# Patient Record
Sex: FEMALE | Race: WHITE | Employment: OTHER | URBAN - METROPOLITAN AREA
[De-identification: names, ages, dates, MRNs, and addresses within clinical notes are randomized per-mention and may not be internally consistent; named-entity substitution may affect disease eponyms.]

---

## 2017-01-05 RX ORDER — LOSARTAN POTASSIUM 50 MG/1
TABLET ORAL
Qty: 90 TABLET | Refills: 2 | Status: SHIPPED | OUTPATIENT
Start: 2017-01-05 | End: 2017-04-02

## 2017-02-21 ENCOUNTER — OFFICE VISIT (OUTPATIENT)
Dept: INTERNAL MEDICINE CLINIC | Facility: CLINIC | Age: 82
End: 2017-02-21

## 2017-02-21 VITALS
RESPIRATION RATE: 18 BRPM | HEIGHT: 68 IN | TEMPERATURE: 98 F | SYSTOLIC BLOOD PRESSURE: 98 MMHG | OXYGEN SATURATION: 83 % | DIASTOLIC BLOOD PRESSURE: 61 MMHG | HEART RATE: 90 BPM

## 2017-02-21 DIAGNOSIS — J44.9 CHRONIC OBSTRUCTIVE PULMONARY DISEASE, UNSPECIFIED COPD TYPE (HCC): Primary | ICD-10-CM

## 2017-02-21 DIAGNOSIS — R09.02 HYPOXIA: ICD-10-CM

## 2017-02-21 DIAGNOSIS — R26.81 UNSTEADY GAIT: ICD-10-CM

## 2017-02-21 DIAGNOSIS — S12.000D: ICD-10-CM

## 2017-02-21 DIAGNOSIS — E03.9 ACQUIRED HYPOTHYROIDISM: ICD-10-CM

## 2017-02-21 PROCEDURE — G0463 HOSPITAL OUTPT CLINIC VISIT: HCPCS | Performed by: INTERNAL MEDICINE

## 2017-02-21 PROCEDURE — 99214 OFFICE O/P EST MOD 30 MIN: CPT | Performed by: INTERNAL MEDICINE

## 2017-02-27 ENCOUNTER — TELEPHONE (OUTPATIENT)
Dept: INTERNAL MEDICINE CLINIC | Facility: CLINIC | Age: 82
End: 2017-02-27

## 2017-02-28 ENCOUNTER — TELEPHONE (OUTPATIENT)
Dept: INTERNAL MEDICINE CLINIC | Facility: CLINIC | Age: 82
End: 2017-02-28

## 2017-02-28 NOTE — TELEPHONE ENCOUNTER
Brina from [facility] calling and requesting a copy of patients demographic information and insurance info.    Please fax to [phone]

## 2017-03-02 ENCOUNTER — OFFICE VISIT (OUTPATIENT)
Dept: PULMONOLOGY | Facility: CLINIC | Age: 82
End: 2017-03-02

## 2017-03-02 VITALS
WEIGHT: 158 LBS | HEIGHT: 68 IN | OXYGEN SATURATION: 90 % | SYSTOLIC BLOOD PRESSURE: 131 MMHG | RESPIRATION RATE: 18 BRPM | BODY MASS INDEX: 23.95 KG/M2 | HEART RATE: 92 BPM | DIASTOLIC BLOOD PRESSURE: 73 MMHG

## 2017-03-02 DIAGNOSIS — J44.9 CHRONIC OBSTRUCTIVE PULMONARY DISEASE, UNSPECIFIED COPD TYPE (HCC): Primary | ICD-10-CM

## 2017-03-02 PROCEDURE — G0463 HOSPITAL OUTPT CLINIC VISIT: HCPCS | Performed by: INTERNAL MEDICINE

## 2017-03-02 PROCEDURE — 99213 OFFICE O/P EST LOW 20 MIN: CPT | Performed by: INTERNAL MEDICINE

## 2017-03-02 NOTE — TELEPHONE ENCOUNTER
Called to [facility] [phone] and spoke with Mandy Craig RN in the intake dept. He is reviewing the orders and notes for pt and also attempting to assess insurance verfication as well as if they can staff where pt lives.  Awaiting call b

## 2017-03-02 NOTE — PROGRESS NOTES
The patient is an 59-year-old female who I know well from prior evaluation who comes in now for follow-up. Her breathing is about the same. She has a spiculated pulmonary nodule which has been stable on serial CT imaging. Is no fever chills or shakes.

## 2017-03-03 ENCOUNTER — TELEPHONE (OUTPATIENT)
Dept: FAMILY MEDICINE CLINIC | Facility: CLINIC | Age: 82
End: 2017-03-03

## 2017-03-03 NOTE — TELEPHONE ENCOUNTER
[facility] just [facility] me back and said they can't staff her area, so I contacted a previous [address] she had and that was Provider Preferred [address] and spoke with Carmelo Cuevas at [phone] and he said they had pt before and

## 2017-03-03 NOTE — TELEPHONE ENCOUNTER
Pt. Is now being taken care of  for [facility] by [facility]. At [phone]. I spoke with Carolin Gaucher at [facility] and they worked with pt before. Farrah Short was not able to staff pt in that area, so they declined the case.  All is set for [facility]

## 2017-04-03 RX ORDER — LOSARTAN POTASSIUM 50 MG/1
TABLET ORAL
Qty: 90 TABLET | Refills: 0 | Status: SHIPPED | OUTPATIENT
Start: 2017-04-03 | End: 2017-05-22

## 2017-04-03 NOTE — TELEPHONE ENCOUNTER
Hypertensive Medications  Protocol Criteria:  · Appointment scheduled in the past 6 months or in the next 3 months  · BMP or CMP in the past 12 months  · Creatinine result < 2  Recent Visits       Provider Department Primary Dx    1 month ago Kaleigh Nicholas

## 2017-04-12 NOTE — TELEPHONE ENCOUNTER
Hypothyroid Medications  Protocol Criteria:  Appointment scheduled in the past 12 months or the next 3 months  TSH resulted in the past 12 months that is normal  Recent Visits       Provider Department Primary Dx    1 month ago Kaylyn Dubon MD [facility]

## 2017-04-14 NOTE — PROGRESS NOTES
HPI:    Patient ID: Mary Ann Grider is a 80year old female. HPICOPD: Taking medication as directed. Short of breath on 3 L a minute of continuous oxygen by cannula.   Will follow up with Dr. Ashlee Aden  Hypothyroidism treated with levothyroxine 75 MCG daily (CLARITIN) 10 MG Oral Tab Take 10 mg by mouth daily. Disp:  Rfl:    Calcium Carbonate-Vit D-Min (CALTRATE 600+D PLUS MINERALS) 600-800 MG-UNIT Oral Tab Take  by mouth.  Disp:  Rfl:    Albuterol Sulfate HFA (VENTOLIN HFA) 108 (90 BASE) MCG/ACT Inhalation Aer healing, unspecified fracture morphology, subsequent encounter  Follow with neurosurgery as directed  - Home Health Referral - In Network    4. Unsteady gait  Physical therapy at home  - Home Health Referral - In Network    5.  Hypoxia  Continuous oxygen at

## 2017-04-26 RX ORDER — LEVOTHYROXINE SODIUM 0.07 MG/1
TABLET ORAL
Qty: 90 TABLET | Refills: 0 | Status: SHIPPED | OUTPATIENT
Start: 2017-04-26 | End: 2017-07-25

## 2017-04-30 RX ORDER — TRIAMTERENE AND HYDROCHLOROTHIAZIDE 37.5; 25 MG/1; MG/1
CAPSULE ORAL
Qty: 90 CAPSULE | Refills: 2 | Status: SHIPPED | OUTPATIENT
Start: 2017-04-30 | End: 2018-01-24

## 2017-05-22 ENCOUNTER — OFFICE VISIT (OUTPATIENT)
Dept: INTERNAL MEDICINE CLINIC | Facility: CLINIC | Age: 82
End: 2017-05-22

## 2017-05-22 VITALS
WEIGHT: 151 LBS | TEMPERATURE: 98 F | HEART RATE: 87 BPM | BODY MASS INDEX: 22.88 KG/M2 | DIASTOLIC BLOOD PRESSURE: 77 MMHG | SYSTOLIC BLOOD PRESSURE: 124 MMHG | HEIGHT: 68 IN | RESPIRATION RATE: 20 BRPM

## 2017-05-22 DIAGNOSIS — E03.9 ACQUIRED HYPOTHYROIDISM: ICD-10-CM

## 2017-05-22 DIAGNOSIS — E55.9 VITAMIN D DEFICIENCY: ICD-10-CM

## 2017-05-22 DIAGNOSIS — D50.9 IRON DEFICIENCY ANEMIA, UNSPECIFIED IRON DEFICIENCY ANEMIA TYPE: ICD-10-CM

## 2017-05-22 DIAGNOSIS — J44.9 CHRONIC OBSTRUCTIVE PULMONARY DISEASE, UNSPECIFIED COPD TYPE (HCC): ICD-10-CM

## 2017-05-22 DIAGNOSIS — I10 ESSENTIAL HYPERTENSION: Primary | ICD-10-CM

## 2017-05-22 PROCEDURE — 99214 OFFICE O/P EST MOD 30 MIN: CPT | Performed by: INTERNAL MEDICINE

## 2017-05-22 PROCEDURE — G0463 HOSPITAL OUTPT CLINIC VISIT: HCPCS | Performed by: INTERNAL MEDICINE

## 2017-05-22 NOTE — PROGRESS NOTES
HPI:    Patient ID: Vinicius Ernandez is a 80year old female. Hypertension  This is a chronic problem. The current episode started more than 1 year ago. The problem is controlled.  Pertinent negatives include no anxiety, blurred vision, chest pain, headache Rfl: 0   Loperamide HCl (IMODIUM) 2 MG Oral Cap  Disp:  Rfl: 0   Meloxicam 7.5 MG Oral Tab Take 7.5 mg by mouth daily. Disp:  Rfl:    Potassium Chloride ER (K-DUR M20) 20 MEQ Oral Tab CR Take 20 mEq by mouth 2 (two) times daily.  Disp:  Rfl:    ipratropium- hepatosplenomegaly. There is no tenderness. There is no CVA tenderness. Musculoskeletal: She exhibits no edema. Lymphadenopathy:     She has no cervical adenopathy. Neurological: She is alert and oriented to person, place, and time.    Skin: Skin is w

## 2017-05-24 ENCOUNTER — TELEPHONE (OUTPATIENT)
Dept: INTERNAL MEDICINE CLINIC | Facility: CLINIC | Age: 82
End: 2017-05-24

## 2017-05-24 ENCOUNTER — LAB ENCOUNTER (OUTPATIENT)
Dept: LAB | Age: 82
End: 2017-05-24
Attending: INTERNAL MEDICINE
Payer: MEDICARE

## 2017-05-24 DIAGNOSIS — D50.9 IRON DEFICIENCY ANEMIA, UNSPECIFIED IRON DEFICIENCY ANEMIA TYPE: ICD-10-CM

## 2017-05-24 DIAGNOSIS — I10 ESSENTIAL HYPERTENSION: ICD-10-CM

## 2017-05-24 DIAGNOSIS — D72.829 LEUKOCYTOSIS, UNSPECIFIED TYPE: Primary | ICD-10-CM

## 2017-05-24 DIAGNOSIS — F17.200 SMOKER: ICD-10-CM

## 2017-05-24 DIAGNOSIS — E55.9 VITAMIN D DEFICIENCY: ICD-10-CM

## 2017-05-24 DIAGNOSIS — R30.0 DYSURIA: ICD-10-CM

## 2017-05-24 PROCEDURE — 84443 ASSAY THYROID STIM HORMONE: CPT

## 2017-05-24 PROCEDURE — 85025 COMPLETE CBC W/AUTO DIFF WBC: CPT

## 2017-05-24 PROCEDURE — 80061 LIPID PANEL: CPT

## 2017-05-24 PROCEDURE — 82746 ASSAY OF FOLIC ACID SERUM: CPT

## 2017-05-24 PROCEDURE — 82607 VITAMIN B-12: CPT

## 2017-05-24 PROCEDURE — 82306 VITAMIN D 25 HYDROXY: CPT

## 2017-05-24 PROCEDURE — 82728 ASSAY OF FERRITIN: CPT

## 2017-05-24 PROCEDURE — 36415 COLL VENOUS BLD VENIPUNCTURE: CPT

## 2017-05-24 NOTE — TELEPHONE ENCOUNTER
Kulwant Lopes dropped off at the [facility] [employer] 3rd flr a list of patients medication as requested by dr Hero Torres. Placed in tray at [facility] 3rd flr in box of Dr Hero Torres 5/24/17.

## 2017-06-05 ENCOUNTER — TELEPHONE (OUTPATIENT)
Dept: FAMILY MEDICINE CLINIC | Facility: CLINIC | Age: 82
End: 2017-06-05

## 2017-06-05 NOTE — TELEPHONE ENCOUNTER
----- Message from Thee Meza MD sent at 6/4/2017  5:33 PM CDT -----  Please call patient with blood test results    That are stable - except elevated white blood cell count 14 k .months ago was normal 8.8 but few 1 year ago was elevated 28 k  and

## 2017-06-13 ENCOUNTER — OFFICE VISIT (OUTPATIENT)
Dept: HEMATOLOGY/ONCOLOGY | Facility: HOSPITAL | Age: 82
End: 2017-06-13
Attending: INTERNAL MEDICINE
Payer: MEDICARE

## 2017-06-13 VITALS
DIASTOLIC BLOOD PRESSURE: 71 MMHG | RESPIRATION RATE: 18 BRPM | OXYGEN SATURATION: 93 % | HEIGHT: 68 IN | TEMPERATURE: 98 F | SYSTOLIC BLOOD PRESSURE: 133 MMHG | BODY MASS INDEX: 22.58 KG/M2 | WEIGHT: 149 LBS | HEART RATE: 96 BPM

## 2017-06-13 DIAGNOSIS — E03.9 ACQUIRED HYPOTHYROIDISM: ICD-10-CM

## 2017-06-13 DIAGNOSIS — Z86.39 HISTORY OF IRON DEFICIENCY: ICD-10-CM

## 2017-06-13 DIAGNOSIS — J44.9 CHRONIC OBSTRUCTIVE PULMONARY DISEASE, UNSPECIFIED COPD TYPE (HCC): ICD-10-CM

## 2017-06-13 DIAGNOSIS — R91.8 PULMONARY NODULES: ICD-10-CM

## 2017-06-13 DIAGNOSIS — D72.9 NEUTROPHILIC LEUKOCYTOSIS: Primary | ICD-10-CM

## 2017-06-13 DIAGNOSIS — T07.XXXA MULTIPLE FRACTURES: ICD-10-CM

## 2017-06-13 PROCEDURE — 99203 OFFICE O/P NEW LOW 30 MIN: CPT | Performed by: INTERNAL MEDICINE

## 2017-06-13 PROCEDURE — G0463 HOSPITAL OUTPT CLINIC VISIT: HCPCS | Performed by: INTERNAL MEDICINE

## 2017-06-13 RX ORDER — UBIDECARENONE/VIT E ACET 100MG-5
100 CAPSULE ORAL DAILY
COMMUNITY

## 2017-06-13 RX ORDER — UBIDECARENONE/VITAMIN E MIXED 100 MG-100
100 CAPSULE ORAL DAILY
COMMUNITY

## 2017-06-13 RX ORDER — BIOTIN 1 MG
2 TABLET ORAL DAILY
COMMUNITY

## 2017-06-13 RX ORDER — PROPRANOLOL/HYDROCHLOROTHIAZID 40 MG-25MG
500 TABLET ORAL DAILY
COMMUNITY

## 2017-06-13 RX ORDER — PERPHENAZINE/AMITRIPTYLINE HCL 2 MG-10 MG
4 TABLET ORAL DAILY
COMMUNITY

## 2017-06-14 NOTE — TELEPHONE ENCOUNTER
Chart reviewed and patient was seen by Dr. Arley gomez. 6/13/17. Results were discussed with patient; see lab notes.

## 2017-06-15 ENCOUNTER — TELEPHONE (OUTPATIENT)
Dept: INTERNAL MEDICINE CLINIC | Facility: CLINIC | Age: 82
End: 2017-06-15

## 2017-06-15 NOTE — TELEPHONE ENCOUNTER
JOLLY valentino a Plan of care physicians orders, PT discharge and recert order was faxed into the office in May and last Tuesday   HHN need to make sure these forms are completed and returned lee Valentino the Claude is in the building and these forms are nejulianne

## 2017-06-22 ENCOUNTER — TELEPHONE (OUTPATIENT)
Dept: PULMONOLOGY | Facility: CLINIC | Age: 82
End: 2017-06-22

## 2017-06-22 ENCOUNTER — LAB ENCOUNTER (OUTPATIENT)
Dept: LAB | Age: 82
End: 2017-06-22
Attending: INTERNAL MEDICINE
Payer: MEDICARE

## 2017-06-22 DIAGNOSIS — D72.829 LEUKOCYTOSIS, UNSPECIFIED TYPE: ICD-10-CM

## 2017-06-22 DIAGNOSIS — R30.0 DYSURIA: ICD-10-CM

## 2017-06-22 DIAGNOSIS — I10 ESSENTIAL HYPERTENSION: ICD-10-CM

## 2017-06-22 PROCEDURE — 36415 COLL VENOUS BLD VENIPUNCTURE: CPT

## 2017-06-22 PROCEDURE — 85025 COMPLETE CBC W/AUTO DIFF WBC: CPT

## 2017-06-22 PROCEDURE — 80053 COMPREHEN METABOLIC PANEL: CPT

## 2017-06-22 PROCEDURE — 87086 URINE CULTURE/COLONY COUNT: CPT

## 2017-06-22 PROCEDURE — 84443 ASSAY THYROID STIM HORMONE: CPT

## 2017-06-25 NOTE — PROGRESS NOTES
Please call patient with blood test results. Kidney function is decreased and stable and liver function are normal, no anemia.    sugar is mildly elevated not sure if patient was fasting  Thyroid hormone is normal as well. Urine culture is clear.   No

## 2017-06-26 ENCOUNTER — TELEPHONE (OUTPATIENT)
Dept: INTERNAL MEDICINE CLINIC | Facility: CLINIC | Age: 82
End: 2017-06-26

## 2017-06-26 ENCOUNTER — HOSPITAL ENCOUNTER (INPATIENT)
Facility: HOSPITAL | Age: 82
LOS: 2 days | Discharge: HOME | DRG: 603 | End: 2017-06-28
Attending: EMERGENCY MEDICINE | Admitting: HOSPITALIST
Payer: MEDICARE

## 2017-06-26 DIAGNOSIS — L03.115 CELLULITIS OF RIGHT LOWER EXTREMITY: Primary | ICD-10-CM

## 2017-06-26 PROBLEM — R79.89 AZOTEMIA: Status: ACTIVE | Noted: 2017-06-26

## 2017-06-26 LAB
ANION GAP SERPL CALC-SCNC: 11 MMOL/L (ref 0–18)
BASOPHILS # BLD: 0.1 K/UL (ref 0–0.2)
BASOPHILS NFR BLD: 1 %
BNP SERPL-MCNC: 50 PG/ML (ref 0–100)
BUN SERPL-MCNC: 25 MG/DL (ref 8–20)
BUN/CREAT SERPL: 23.4 (ref 10–20)
CALCIUM SERPL-MCNC: 9.4 MG/DL (ref 8.5–10.5)
CHLORIDE SERPL-SCNC: 90 MMOL/L (ref 95–110)
CO2 SERPL-SCNC: 37 MMOL/L (ref 22–32)
CREAT SERPL-MCNC: 1.07 MG/DL (ref 0.5–1.5)
EOSINOPHIL # BLD: 0.3 K/UL (ref 0–0.7)
EOSINOPHIL NFR BLD: 3 %
ERYTHROCYTE [DISTWIDTH] IN BLOOD BY AUTOMATED COUNT: 12.9 % (ref 11–15)
GLUCOSE SERPL-MCNC: 113 MG/DL (ref 70–99)
HCT VFR BLD AUTO: 40.6 % (ref 35–48)
HGB BLD-MCNC: 13.1 G/DL (ref 12–16)
LYMPHOCYTES # BLD: 1.3 K/UL (ref 1–4)
LYMPHOCYTES NFR BLD: 14 %
MCH RBC QN AUTO: 29.9 PG (ref 27–32)
MCHC RBC AUTO-ENTMCNC: 32.2 G/DL (ref 32–37)
MCV RBC AUTO: 92.7 FL (ref 80–100)
MONOCYTES # BLD: 1 K/UL (ref 0–1)
MONOCYTES NFR BLD: 10 %
NEUTROPHILS # BLD AUTO: 6.8 K/UL (ref 1.8–7.7)
NEUTROPHILS NFR BLD: 72 %
OSMOLALITY UR CALC.SUM OF ELEC: 291 MOSM/KG (ref 275–295)
PLATELET # BLD AUTO: 214 K/UL (ref 140–400)
PMV BLD AUTO: 9 FL (ref 7.4–10.3)
POTASSIUM SERPL-SCNC: 3.4 MMOL/L (ref 3.3–5.1)
RBC # BLD AUTO: 4.38 M/UL (ref 3.7–5.4)
SODIUM SERPL-SCNC: 138 MMOL/L (ref 136–144)
WBC # BLD AUTO: 9.4 K/UL (ref 4–11)

## 2017-06-26 PROCEDURE — 99222 1ST HOSP IP/OBS MODERATE 55: CPT | Performed by: HOSPITALIST

## 2017-06-26 RX ORDER — BISACODYL 10 MG
10 SUPPOSITORY, RECTAL RECTAL
Status: DISCONTINUED | OUTPATIENT
Start: 2017-06-26 | End: 2017-06-28

## 2017-06-26 RX ORDER — ONDANSETRON 2 MG/ML
4 INJECTION INTRAMUSCULAR; INTRAVENOUS EVERY 6 HOURS PRN
Status: DISCONTINUED | OUTPATIENT
Start: 2017-06-26 | End: 2017-06-28

## 2017-06-26 RX ORDER — HYDROCODONE BITARTRATE AND ACETAMINOPHEN 5; 325 MG/1; MG/1
1 TABLET ORAL EVERY 4 HOURS PRN
Status: DISCONTINUED | OUTPATIENT
Start: 2017-06-26 | End: 2017-06-28

## 2017-06-26 RX ORDER — ACETAMINOPHEN 325 MG/1
650 TABLET ORAL EVERY 4 HOURS PRN
Status: DISCONTINUED | OUTPATIENT
Start: 2017-06-26 | End: 2017-06-28

## 2017-06-26 RX ORDER — HYDROCODONE BITARTRATE AND ACETAMINOPHEN 5; 325 MG/1; MG/1
2 TABLET ORAL EVERY 4 HOURS PRN
Status: DISCONTINUED | OUTPATIENT
Start: 2017-06-26 | End: 2017-06-28

## 2017-06-26 RX ORDER — ACETAMINOPHEN 325 MG/1
650 TABLET ORAL EVERY 6 HOURS PRN
Status: DISCONTINUED | OUTPATIENT
Start: 2017-06-26 | End: 2017-06-28

## 2017-06-26 RX ORDER — POLYETHYLENE GLYCOL 3350 17 G/17G
17 POWDER, FOR SOLUTION ORAL DAILY PRN
Status: DISCONTINUED | OUTPATIENT
Start: 2017-06-26 | End: 2017-06-28

## 2017-06-26 RX ORDER — SODIUM PHOSPHATE, DIBASIC AND SODIUM PHOSPHATE, MONOBASIC 7; 19 G/133ML; G/133ML
1 ENEMA RECTAL ONCE AS NEEDED
Status: DISCONTINUED | OUTPATIENT
Start: 2017-06-26 | End: 2017-06-28

## 2017-06-26 RX ORDER — DOCUSATE SODIUM 100 MG/1
100 CAPSULE, LIQUID FILLED ORAL 2 TIMES DAILY
Status: DISCONTINUED | OUTPATIENT
Start: 2017-06-26 | End: 2017-06-28

## 2017-06-26 RX ORDER — HEPARIN SODIUM 5000 [USP'U]/ML
5000 INJECTION, SOLUTION INTRAVENOUS; SUBCUTANEOUS EVERY 12 HOURS SCHEDULED
Status: DISCONTINUED | OUTPATIENT
Start: 2017-06-26 | End: 2017-06-28

## 2017-06-26 RX ORDER — SACCHAROMYCES BOULARDII 250 MG
250 CAPSULE ORAL 2 TIMES DAILY
Status: DISCONTINUED | OUTPATIENT
Start: 2017-06-26 | End: 2017-06-28

## 2017-06-26 NOTE — H&P
[facility]    PATIENT'S NAME: Anni    ATTENDING PHYSICIAN: Jamila Younger MD   PATIENT ACCOUNT#:   [id number]    LOCATION:  [address]  MEDICAL RECORD #:   [id number]       YOB: 1929  ADMISSION DATE:       06/26/2017 is overall a poor historian. She does not remember if she had any trauma on her foot. Other 12-point review of systems negative. PHYSICAL EXAMINATION:    GENERAL:  Alert and oriented to time, place and person, no acute distress.   VITAL SIGNS:  [employer]

## 2017-06-26 NOTE — TELEPHONE ENCOUNTER
Actions Requested: wanted appt for eval.  Sent to ED for immediate eval  Situation/Background   Problem: right foot swelling and redness, painful 4/10   Onset: this morning < 24 hours   Associated Symptoms: HHRN calling in no trauma or injury to site, able Difficulty breathing at rest  * Thigh or calf pain and only 1 side and present > 1 hour  * Thigh, calf, or ankle swelling in only one leg  * Thigh, calf, or ankle swelling in both legs, but one side is definitely more swollen  * Cast on leg or ankle and ha

## 2017-06-26 NOTE — ED PROVIDER NOTES
Patient Seen in: [facility] Emergency Department    History   Patient presents with:  Swelling Edema (cardiovascular, metabolic)    Stated Complaint: R foot cellulitis     HPI    The patient is an 26-year-old female who presents with right foot re Losartan Potassium (COZAAR) 50 MG Oral Tab,  TAKE 1 TABLET DAILY, TAKE TWICE A DAY IF BLOOD PRESSURE GREATER THAN 150/90   ipratropium-albuterol (DUONEB) 0.5-2.5 (3) MG/3ML Inhalation Solution,  Take 3 mL by nebulization every 4 (four) hours as needed. Cardiovascular: Normal rate, regular rhythm, normal heart sounds and intact distal pulses. No murmur heard. Pulmonary/Chest: Effort normal and breath sounds normal. She has no wheezes. Abdominal: Soft.  Bowel sounds are normal. She exhibits no distens ------------------------------------------------------------   IV Ancef started   MDM     Pulse Ox: (!) 89%, Abnormal - but at baseline, improved after O2 to 94%    Cardiac Monitor: Pulse Readings from Last 1 Encounters:  06/26/17 : 91  , sinus, normal

## 2017-06-26 NOTE — ED INITIAL ASSESSMENT (HPI)
Right ankle swelling pt is unaware of how long its been swollen. Pt denies trauma to area. Pt is on 2l nc from home and reports hx of copd.  Denies hx of chf

## 2017-06-26 NOTE — ED NOTES
Care assumed from triage alert and interactive with 24 hour hx edema to R foot.  R foot with cellulitic appearance - indurated, edematous with mild pain + DP/PT pulses

## 2017-06-27 LAB
ANION GAP SERPL CALC-SCNC: 11 MMOL/L (ref 0–18)
BASOPHILS # BLD: 0 K/UL (ref 0–0.2)
BASOPHILS NFR BLD: 1 %
BUN SERPL-MCNC: 20 MG/DL (ref 8–20)
BUN/CREAT SERPL: 18.7 (ref 10–20)
CALCIUM SERPL-MCNC: 8.9 MG/DL (ref 8.5–10.5)
CHLORIDE SERPL-SCNC: 94 MMOL/L (ref 95–110)
CO2 SERPL-SCNC: 33 MMOL/L (ref 22–32)
CREAT SERPL-MCNC: 1.07 MG/DL (ref 0.5–1.5)
EOSINOPHIL # BLD: 0.4 K/UL (ref 0–0.7)
EOSINOPHIL NFR BLD: 5 %
ERYTHROCYTE [DISTWIDTH] IN BLOOD BY AUTOMATED COUNT: 13.2 % (ref 11–15)
GLUCOSE SERPL-MCNC: 94 MG/DL (ref 70–99)
HCT VFR BLD AUTO: 37.1 % (ref 35–48)
HGB BLD-MCNC: 12.3 G/DL (ref 12–16)
LYMPHOCYTES # BLD: 1.3 K/UL (ref 1–4)
LYMPHOCYTES NFR BLD: 17 %
MCH RBC QN AUTO: 30.1 PG (ref 27–32)
MCHC RBC AUTO-ENTMCNC: 33.2 G/DL (ref 32–37)
MCV RBC AUTO: 90.9 FL (ref 80–100)
MONOCYTES # BLD: 0.8 K/UL (ref 0–1)
MONOCYTES NFR BLD: 11 %
NEUTROPHILS # BLD AUTO: 5.2 K/UL (ref 1.8–7.7)
NEUTROPHILS NFR BLD: 67 %
OSMOLALITY UR CALC.SUM OF ELEC: 288 MOSM/KG (ref 275–295)
PLATELET # BLD AUTO: 182 K/UL (ref 140–400)
PMV BLD AUTO: 8.6 FL (ref 7.4–10.3)
POTASSIUM SERPL-SCNC: 3 MMOL/L (ref 3.3–5.1)
RBC # BLD AUTO: 4.08 M/UL (ref 3.7–5.4)
SODIUM SERPL-SCNC: 138 MMOL/L (ref 136–144)
WBC # BLD AUTO: 7.7 K/UL (ref 4–11)

## 2017-06-27 PROCEDURE — 99233 SBSQ HOSP IP/OBS HIGH 50: CPT | Performed by: HOSPITALIST

## 2017-06-27 RX ORDER — ARIPIPRAZOLE 15 MG/1
40 TABLET ORAL EVERY 4 HOURS
Status: COMPLETED | OUTPATIENT
Start: 2017-06-27 | End: 2017-06-27

## 2017-06-27 RX ORDER — LEVOTHYROXINE SODIUM 0.07 MG/1
75 TABLET ORAL DAILY
Status: DISCONTINUED | OUTPATIENT
Start: 2017-06-27 | End: 2017-06-28

## 2017-06-27 RX ORDER — TRIAMTERENE AND HYDROCHLOROTHIAZIDE 37.5; 25 MG/1; MG/1
1 CAPSULE ORAL DAILY
Status: DISCONTINUED | OUTPATIENT
Start: 2017-06-27 | End: 2017-06-28

## 2017-06-27 RX ORDER — 0.9 % SODIUM CHLORIDE 0.9 %
VIAL (ML) INJECTION
Status: COMPLETED
Start: 2017-06-27 | End: 2017-06-27

## 2017-06-27 RX ORDER — IPRATROPIUM BROMIDE AND ALBUTEROL SULFATE 2.5; .5 MG/3ML; MG/3ML
3 SOLUTION RESPIRATORY (INHALATION) EVERY 4 HOURS PRN
Status: DISCONTINUED | OUTPATIENT
Start: 2017-06-27 | End: 2017-06-28

## 2017-06-27 NOTE — CM/SW NOTE
Met with patient at bedside. Patient lives home with her brother. Patient has home oxygen and a home nebulizer. Patient has a cane and walker at home and uses either one as needed. Patient does have a Teresita Caballero Rn and PT but is unsure the name of the company.  PT

## 2017-06-27 NOTE — CM/SW NOTE
Met with patient at bedside to explain the BPCI/Medicare program. Patient agreed with phone follow up for 3 months from [address] [place] [address] after discharge from [address] [place] [address]. Patient was enrolled under DRG  603  . BPCI/Medicare letter and brochure provided.

## 2017-06-27 NOTE — PHYSICAL THERAPY NOTE
PHYSICAL THERAPY EVALUATION - INPATIENT     Room Number: 544/544-A  Evaluation Date: 6/27/2017  Type of Evaluation: Initial  Physician Order: PT Eval and Treat    Presenting Problem: cellulitis  Reason for Therapy: Mobility Dysfunction and Discharge Pl History  Past Medical History:   Diagnosis Date   • Actinic keratosis    • COPD (chronic obstructive pulmonary disease) ([facility]    • Fracture, cervical vertebra ([facility] 9/2014    surgically repaired   • Hearing impairment    • High blood pressure    • History chair with arms (e.g., wheelchair, bedside commode, etc.): None   -   Moving from lying on back to sitting on the side of the bed?: None   How much help from another person does the patient currently need. ..   -   Moving to and from a bed to a chair (inclu Goal #3 Patient is able to ambulate 150 feet with assist device: walker - rolling at assistance level: modified independent   Goal #3   Current Status    Goal #4 Patient will negotiate 3  stairs/one curb w/ assistive device and supervision   Goal #4   Cu

## 2017-06-27 NOTE — PLAN OF CARE
Problem: Patient/Family Goals  Goal: Patient/Family Long Term Goal  Patient's Long Term Goal: To be discharged    Interventions:  - Follow MD's orders  - Take medications as prescribed  - See additional Care Plan goals for specific interventions   Outcome: assist with strengthening/mobility  - Encourage toileting schedule  Outcome: Progressing  Pt's bed is in the lowest position; the call light is within reach; the bed alarm is activated; and pt calls appropriately.      Problem: SKIN/TISSUE INTEGRITY - ADULT

## 2017-06-27 NOTE — PROGRESS NOTES
[facility]    Progress Note    Dora Bristol Patient Status:  Inpatient    1929 MRN [id number]   Location [facility] 5SW/SE Attending Sallie Gregorio MD   Hosp Day # 1 PCP Carmina Og MD       Subjective:     Right f

## 2017-06-27 NOTE — PLAN OF CARE
Problem: Patient/Family Goals  Goal: Patient/Family Long Term Goal  Patient's Long Term Goal: To be discharged    Interventions:  - Follow MD's orders  - Take medications as prescribed  - See additional Care Plan goals for specific interventions    Outcome strengthening/mobility  - Encourage toileting schedule   Outcome: Progressing      Problem: SKIN/TISSUE INTEGRITY - ADULT  Goal: Skin integrity remains intact  INTERVENTIONS  - Assess and document risk factors for pressure ulcer development  - Assess and d

## 2017-06-28 VITALS
OXYGEN SATURATION: 99 % | TEMPERATURE: 98 F | SYSTOLIC BLOOD PRESSURE: 125 MMHG | RESPIRATION RATE: 18 BRPM | WEIGHT: 149 LBS | HEART RATE: 95 BPM | BODY MASS INDEX: 22.58 KG/M2 | HEIGHT: 68 IN | DIASTOLIC BLOOD PRESSURE: 64 MMHG

## 2017-06-28 LAB — POTASSIUM SERPL-SCNC: 4.1 MMOL/L (ref 3.3–5.1)

## 2017-06-28 PROCEDURE — 99239 HOSP IP/OBS DSCHRG MGMT >30: CPT | Performed by: HOSPITALIST

## 2017-06-28 RX ORDER — POLYETHYLENE GLYCOL 3350 17 G/17G
17 POWDER, FOR SOLUTION ORAL 2 TIMES DAILY PRN
Qty: 30 EACH | Refills: 0 | Status: SHIPPED | OUTPATIENT
Start: 2017-06-28

## 2017-06-28 RX ORDER — CEFADROXIL 500 MG/1
500 CAPSULE ORAL 2 TIMES DAILY
Qty: 14 CAPSULE | Refills: 0 | Status: SHIPPED | OUTPATIENT
Start: 2017-06-28 | End: 2017-07-05

## 2017-06-28 RX ORDER — SACCHAROMYCES BOULARDII 250 MG
250 CAPSULE ORAL 2 TIMES DAILY
Qty: 14 CAPSULE | Refills: 0 | Status: SHIPPED | OUTPATIENT
Start: 2017-06-28 | End: 2017-07-05

## 2017-06-28 RX ORDER — HYDROCODONE BITARTRATE AND ACETAMINOPHEN 5; 325 MG/1; MG/1
1 TABLET ORAL EVERY 6 HOURS PRN
Qty: 20 TABLET | Refills: 0 | Status: SHIPPED | OUTPATIENT
Start: 2017-06-28 | End: 2018-03-19 | Stop reason: ALTCHOICE

## 2017-06-28 NOTE — PLAN OF CARE
Problem: Patient/Family Goals  Goal: Patient/Family Long Term Goal  Patient's Long Term Goal: To be discharged    Interventions:  - Follow MD's orders  - Take medications as prescribed  - See additional Care Plan goals for specific interventions    Outcome strengthening/mobility  - Encourage toileting schedule   Outcome: Progressing  Pt's bed is in the lowest position; the safety socks are on when she ambulates; her call light is within reach; and pt call appropriately.  The bed alarm is also activated for sa

## 2017-06-28 NOTE — PHYSICAL THERAPY NOTE
Patient seen by restorative care this morning and would like to rest.  RN, Garcia Farfan, aware that pt was seen by restorative therapy this AM.   PT to follow up later today or tomorrow.

## 2017-06-28 NOTE — DISCHARGE SUMMARY
[facility]    Discharge Summary    [facility] Patient Status:  Inpatient    1929 MRN [id number]   Location [facility] 5SW/SE Attending No att. providers found   Hosp Day # 2 PCP Thee Meza MD     Date of Admissi BUN of 25, and creatinine 1.05. Estimated GFR 49, which is around her baseline. CBC was unremarkable. The patient was started on IV Ancef, and she will be admitted to the hospital for further management and evaluation.     Hospital Course:     Pt was adm MG/3ML Soln  Commonly known as:  DUONEB      Take 3 mL by nebulization every 4 (four) hours as needed.    Quantity:  180 vial  Refills:  5     Levothyroxine Sodium 75 MCG Tabs  Commonly known as:  SYNTHROID, LEVOTHROID      TAKE 1 TABLET DAILY   Quantity: Caps  · HYDROcodone-acetaminophen 5-325 MG Tabs  · PEG 3350 Pack  · saccharomyces boulardii 250 MG Caps         Follow up Visits: Follow-up Information     Nimco Cooper MD In 5 days.     Specialty:  Internal Medicine  Contact information:  [address]

## 2017-06-28 NOTE — CM/SW NOTE
Patients [facility] is Provider Preferred in [facility]. Clinicals and resumption orders sent to Provider Preferred at [phone].  They were notified of patients discharge today and will resume care starting 6/29/17

## 2017-06-28 NOTE — DISCHARGE PLANNING
Discharge instructions and prescriptions given to patient. Patient and brother instructed to follow up with doctor for appointment. Saline lock removed. Patient transported by wheelchair and discharge home.  Patient and brother verbalized understanding of d

## 2017-06-28 NOTE — RESTORATIVE THERAPY
RESTORATIVE CARE TREATMENT NOTE    Presenting Problem  Presenting Problem: cellulitis          Precautions          Weight Bearing Restriction                      SUNDAY MONDAY TUESDAY WEDNESDAY THURSDAY FRIDAY SATURDAY   TRANSFERS          Bed <-> Chair

## 2017-06-28 NOTE — PLAN OF CARE
Problem: Patient/Family Goals  Goal: Patient/Family Long Term Goal  Patient's Long Term Goal: To be discharged    Interventions:  - Follow MD's orders  - Take medications as prescribed  - See additional Care Plan goals for specific interventions    Outcome ADULT  Goal: Absence of fever/infection during anticipated neutropenic period  INTERVENTIONS  - Monitor WBC  - Administer growth factors as ordered  - Implement neutropenic guidelines   Outcome: Progressing  (1) Patient remains afebrile and receives iv ant

## 2017-06-29 ENCOUNTER — PATIENT OUTREACH (OUTPATIENT)
Dept: FAMILY MEDICINE CLINIC | Facility: CLINIC | Age: 82
End: 2017-06-29

## 2017-06-29 NOTE — PROGRESS NOTES
Initial Post Discharge Follow Up   Discharge Date: 6/28/17  Contact Date: 6/29/2017    Consent Verification:  Assessment Completed With: Brother Hilary Schumacher) Per the verbal ok of the pt. HIPAA Verified? Yes    1.  Tell me why you were in the hospital?  \" I DAILY Disp: 180 each Rfl: 3   SPIRIVA HANDIHALER 18 MCG Inhalation Cap INHALE THE CONTENTS OF 1 CAPSULE DAILY Disp: 90 capsule Rfl: 3   Losartan Potassium (COZAAR) 50 MG Oral Tab TAKE 1 TABLET DAILY, TAKE TWICE A DAY IF BLOOD PRESSURE GREATER THAN 150/90 D to your hospitalization? No. She has to lay with her leg elevated. 12. In need of referral for:  Brother declined need for    13.  Do you have a follow up visit scheduled with your Primary Care Physician or Specialist?    Your appointments     Date & Ti and he stated understanding, but said at this point she claims she is pain free. He has continued to keep her leg elevated when she is seated or lying.  He said the redness actually looks to have gone down from the hospital and he is aware to watch her for

## 2017-07-03 NOTE — TELEPHONE ENCOUNTER
Per  of pt,  ROMERO told them that pt will have a RN to visit pt and until now nobody is showing up or call about RN visit or in home therapy. Pls advise. n/a

## 2017-07-10 ENCOUNTER — APPOINTMENT (OUTPATIENT)
Dept: CT IMAGING | Facility: HOSPITAL | Age: 82
End: 2017-07-10
Attending: INTERNAL MEDICINE
Payer: MEDICARE

## 2017-07-10 ENCOUNTER — HOSPITAL ENCOUNTER (OUTPATIENT)
Dept: GENERAL RADIOLOGY | Facility: HOSPITAL | Age: 82
Discharge: HOME | End: 2017-07-10
Attending: INTERNAL MEDICINE | Admitting: INTERNAL MEDICINE
Payer: MEDICARE

## 2017-07-10 ENCOUNTER — OFFICE VISIT (OUTPATIENT)
Dept: INTERNAL MEDICINE CLINIC | Facility: CLINIC | Age: 82
End: 2017-07-10

## 2017-07-10 VITALS
DIASTOLIC BLOOD PRESSURE: 73 MMHG | SYSTOLIC BLOOD PRESSURE: 116 MMHG | WEIGHT: 145 LBS | TEMPERATURE: 98 F | HEART RATE: 90 BPM | BODY MASS INDEX: 21.98 KG/M2 | RESPIRATION RATE: 20 BRPM | HEIGHT: 68 IN

## 2017-07-10 DIAGNOSIS — J44.9 CHRONIC OBSTRUCTIVE PULMONARY DISEASE, UNSPECIFIED COPD TYPE (HCC): Primary | ICD-10-CM

## 2017-07-10 DIAGNOSIS — F17.200 SMOKER: ICD-10-CM

## 2017-07-10 DIAGNOSIS — D72.829 LEUKOCYTOSIS, UNSPECIFIED TYPE: ICD-10-CM

## 2017-07-10 DIAGNOSIS — I10 ESSENTIAL HYPERTENSION: ICD-10-CM

## 2017-07-10 PROCEDURE — 99495 TRANSJ CARE MGMT MOD F2F 14D: CPT | Performed by: INTERNAL MEDICINE

## 2017-07-10 PROCEDURE — 71020 XR CHEST PA + LAT CHEST (CPT=71020): CPT | Performed by: INTERNAL MEDICINE

## 2017-07-10 NOTE — PROGRESS NOTES
HPI:    Jae Toledo is a 80year old female here today for hospital follow up.    She was discharged from Inpatient hospital, [facility]  to Home   Admission Date: 6/26/17   Discharge Date: 6/28/17  Hospital Discharge Diagnosis: Right foot cellul stabilised  And  Sent    Home   On  Oral  Antibiotic  That she  Competed  ,   Denies any pain  And  Redness   Is   Almost  Gone , no  Diarrhea  Patient  states doing well otherwise, denies chest pain, shortness of breath, use  3 L  o2  continuously or hear (four) hours as needed. loratadine (CLARITIN) 10 MG Oral Tab Take 10 mg by mouth daily as needed.      Albuterol Sulfate HFA (VENTOLIN HFA) 108 (90 BASE) MCG/ACT Inhalation Aero Soln Inhale 2 puffs into the lungs every 4 (four) hours as needed for [facility] thyroid history  ALL/ASTHMA: denies hx of allergy or asthma    PHYSICAL EXAM:   No LMP recorded. Patient is postmenopausal.  Estimated body mass index is 22.05 kg/m² as calculated from the following:    Height as of this encounter: 5' 8\" (1.727 m).     Khushbu Hernandez With pt      F/u  Dr Amanda Choudhary  1 month       No orders of the defined types were placed in this encounter.       Meds & Refills for this Visit:  No prescriptions requested or ordered in this encounter    Imaging & Consults:  None         Transitional Care Man

## 2017-07-13 ENCOUNTER — TELEPHONE (OUTPATIENT)
Dept: INTERNAL MEDICINE CLINIC | Facility: CLINIC | Age: 82
End: 2017-07-13

## 2017-07-13 NOTE — TELEPHONE ENCOUNTER
Actions Requested: patient spouse, Amberly Solis, would like advice for treatment of constipation, 5 days without BM with daily Miralax and use of MOM last night, without abdominal distention or pain.  Requests message to be sent to provider, will await call backP

## 2017-07-19 ENCOUNTER — TELEPHONE (OUTPATIENT)
Dept: INTERNAL MEDICINE CLINIC | Facility: CLINIC | Age: 82
End: 2017-07-19

## 2017-07-19 NOTE — TELEPHONE ENCOUNTER
Per brother of pt,  Pt needs refill on her Albuterol Sulfate HFA ,     Current Outpatient Prescriptions:                                                                                                              Albuterol Sulfate HFA (VENTOLIN HFA) 108 (

## 2017-07-20 NOTE — TELEPHONE ENCOUNTER
please advise as does not meet RN protocol for refill criteria- rx listed as historical only    PCP out of office, message to oncall    Asthma & COPD:   Refill Protocol Appointment Criteria  · Appointment scheduled in the past 6 months or in the next 3 mo

## 2017-07-21 RX ORDER — ALBUTEROL SULFATE 90 UG/1
2 AEROSOL, METERED RESPIRATORY (INHALATION) EVERY 4 HOURS PRN
Qty: 3 INHALER | Refills: 0 | Status: SHIPPED | OUTPATIENT
Start: 2017-07-21 | End: 2017-07-24

## 2017-07-24 ENCOUNTER — TELEPHONE (OUTPATIENT)
Dept: INTERNAL MEDICINE CLINIC | Facility: CLINIC | Age: 82
End: 2017-07-24

## 2017-07-24 RX ORDER — ALBUTEROL SULFATE 90 UG/1
2 AEROSOL, METERED RESPIRATORY (INHALATION) EVERY 4 HOURS PRN
Qty: 1 INHALER | Refills: 0 | Status: SHIPPED | OUTPATIENT
Start: 2017-07-24

## 2017-07-24 NOTE — TELEPHONE ENCOUNTER
Pts brother calling and stts the script was never sent to the pharmacy. [facility]  [phone], [phone]

## 2017-07-24 NOTE — TELEPHONE ENCOUNTER
Provider Prefered [facility] [address] calling states pt will need new rx, states she spoke to them today and they still have not received the rx yet. Pt is out of meds please resend rx ASAP. Pt would like an emergency supply sent to local [facility] [address] as well.

## 2017-07-24 NOTE — TELEPHONE ENCOUNTER
Pt had requested one rx for Albuterol be sent to Pharmacy on [address] as pt is totally out and needs a rescue inhaler.     One sent to [address] per pt request

## 2017-07-25 NOTE — TELEPHONE ENCOUNTER
Spoke to [facility] from pharmacy. States did receive prescription. Will be notifying pt when rx is ready for .

## 2017-07-26 RX ORDER — LEVOTHYROXINE SODIUM 0.07 MG/1
TABLET ORAL
Qty: 90 TABLET | Refills: 0 | Status: SHIPPED | OUTPATIENT
Start: 2017-07-26 | End: 2017-10-24

## 2017-07-26 NOTE — TELEPHONE ENCOUNTER
Hypothyroid Medications: Refilled per protocol    Protocol Criteria:  Appointment scheduled in the past 12 months or the next 3 months  TSH resulted in the past 12 months that is normal  Recent Outpatient Visits            2 weeks ago Chronic obstructive p

## 2017-07-27 RX ORDER — IPRATROPIUM BROMIDE AND ALBUTEROL SULFATE 2.5; .5 MG/3ML; MG/3ML
SOLUTION RESPIRATORY (INHALATION)
Qty: 450 ML | Refills: 4 | Status: SHIPPED | OUTPATIENT
Start: 2017-07-27 | End: 2017-11-20

## 2017-08-01 ENCOUNTER — TELEPHONE (OUTPATIENT)
Dept: PULMONOLOGY | Facility: CLINIC | Age: 82
End: 2017-08-01

## 2017-08-01 NOTE — TELEPHONE ENCOUNTER
CMN received, signed by Dr. Donovan Piper, and faxed to [facility]. Fax confirmation received. CMN sent to HIM for scanning. Pt notified of this. Pt verbalized understanding.

## 2017-08-01 NOTE — TELEPHONE ENCOUNTER
Radha from [place] states they need Medicare CMN form completed. She states she will fax to this office.

## 2017-08-01 NOTE — TELEPHONE ENCOUNTER
Also see Te from 7/24/2017 - re: Albuterol Neb Solution - Emily Rodríguez following up on rx refill request.  Pls call. Thank you.

## 2017-08-08 ENCOUNTER — OFFICE VISIT (OUTPATIENT)
Dept: PULMONOLOGY | Facility: CLINIC | Age: 82
End: 2017-08-08

## 2017-08-08 ENCOUNTER — HOSPITAL ENCOUNTER (OUTPATIENT)
Dept: CT IMAGING | Facility: HOSPITAL | Age: 82
Discharge: HOME | End: 2017-08-08
Attending: INTERNAL MEDICINE
Payer: MEDICARE

## 2017-08-08 VITALS
HEIGHT: 68 IN | DIASTOLIC BLOOD PRESSURE: 68 MMHG | OXYGEN SATURATION: 95 % | SYSTOLIC BLOOD PRESSURE: 110 MMHG | WEIGHT: 153.19 LBS | RESPIRATION RATE: 18 BRPM | BODY MASS INDEX: 23.22 KG/M2 | HEART RATE: 88 BPM

## 2017-08-08 DIAGNOSIS — R91.1 PULMONARY NODULE: ICD-10-CM

## 2017-08-08 DIAGNOSIS — J44.9 CHRONIC OBSTRUCTIVE PULMONARY DISEASE, UNSPECIFIED COPD TYPE (HCC): Primary | ICD-10-CM

## 2017-08-08 PROCEDURE — G0463 HOSPITAL OUTPT CLINIC VISIT: HCPCS | Performed by: INTERNAL MEDICINE

## 2017-08-08 PROCEDURE — 94761 N-INVAS EAR/PLS OXIMETRY MLT: CPT | Performed by: INTERNAL MEDICINE

## 2017-08-08 PROCEDURE — 99213 OFFICE O/P EST LOW 20 MIN: CPT | Performed by: INTERNAL MEDICINE

## 2017-08-08 PROCEDURE — 71250 CT THORAX DX C-: CPT | Performed by: INTERNAL MEDICINE

## 2017-08-08 NOTE — PROGRESS NOTES
The patient is an 54-year-old female who I know well from prior evaluation who comes in now for follow-up. In general, she is doing well. She has ongoing chronic supplemental oxygen needs and needs recertification.   She had a recent CT scan the chest whi

## 2017-08-15 RX ORDER — TIOTROPIUM BROMIDE 18 UG/1
CAPSULE ORAL; RESPIRATORY (INHALATION)
Qty: 90 CAPSULE | Refills: 0 | Status: SHIPPED | OUTPATIENT
Start: 2017-08-15 | End: 2017-11-13

## 2017-08-15 NOTE — TELEPHONE ENCOUNTER
Refill Protocol Appointment Criteria: Refilled per protocol    · Appointment scheduled in the past 6 months or in the next 3 months  Recent Outpatient Visits            1 week ago Chronic obstructive pulmonary disease, unspecified COPD type ([facility]    Avelina Emery

## 2017-08-24 ENCOUNTER — MED REC SCAN ONLY (OUTPATIENT)
Dept: INTERNAL MEDICINE CLINIC | Facility: CLINIC | Age: 82
End: 2017-08-24

## 2017-08-30 ENCOUNTER — TELEPHONE (OUTPATIENT)
Dept: INTERNAL MEDICINE CLINIC | Facility: CLINIC | Age: 82
End: 2017-08-30

## 2017-08-30 NOTE — TELEPHONE ENCOUNTER
HH  [facility] WANTS TO KNOW IF DOCTOR WILL BE RE CERTIFIED PATIENT FOR . PATIENT IS SEEN ONCE A WEEK.

## 2017-08-30 NOTE — TELEPHONE ENCOUNTER
LAUREN VM notifying [facility]. Encouraged to forward orders for signature and call with questions or concerns.

## 2017-09-18 ENCOUNTER — OFFICE VISIT (OUTPATIENT)
Dept: INTERNAL MEDICINE CLINIC | Facility: CLINIC | Age: 82
End: 2017-09-18

## 2017-09-18 VITALS
TEMPERATURE: 98 F | RESPIRATION RATE: 20 BRPM | DIASTOLIC BLOOD PRESSURE: 70 MMHG | SYSTOLIC BLOOD PRESSURE: 118 MMHG | HEART RATE: 89 BPM | HEIGHT: 68 IN

## 2017-09-18 DIAGNOSIS — I10 ESSENTIAL HYPERTENSION: Primary | ICD-10-CM

## 2017-09-18 DIAGNOSIS — J44.9 CHRONIC OBSTRUCTIVE PULMONARY DISEASE, UNSPECIFIED COPD TYPE (HCC): ICD-10-CM

## 2017-09-18 DIAGNOSIS — L03.115 CELLULITIS OF RIGHT LOWER EXTREMITY: ICD-10-CM

## 2017-09-18 DIAGNOSIS — Z23 INFLUENZA VACCINATION GIVEN: ICD-10-CM

## 2017-09-18 PROCEDURE — G0463 HOSPITAL OUTPT CLINIC VISIT: HCPCS | Performed by: INTERNAL MEDICINE

## 2017-09-18 PROCEDURE — 90653 IIV ADJUVANT VACCINE IM: CPT | Performed by: INTERNAL MEDICINE

## 2017-09-18 PROCEDURE — 99214 OFFICE O/P EST MOD 30 MIN: CPT | Performed by: INTERNAL MEDICINE

## 2017-09-18 PROCEDURE — G0008 ADMIN INFLUENZA VIRUS VAC: HCPCS | Performed by: INTERNAL MEDICINE

## 2017-09-18 NOTE — PROGRESS NOTES
HPI:    Patient ID: Alex Borges is a 80year old female.     Skin   Chronicity: pt  state  doing  well otherwise   presents  for hx r  leg   celulitis  resolved per pt  feels  no pain  or  redness any more ,denies  swelling  or redness of leg   The abhishek LEVOTHYROXINE SODIUM 75 MCG Oral Tab TAKE 1 TABLET DAILY Disp: 90 tablet Rfl: 0   HYDROcodone-acetaminophen 5-325 MG Oral Tab Take 1 tablet by mouth every 6 (six) hours as needed.  Disp: 20 tablet Rfl: 0   PEG 3350 Oral Powd Pack Take 17 g by mouth 2 (two Tympanic membrane, external ear and ear canal normal.   Nose: Nose normal. Right sinus exhibits no maxillary sinus tenderness and no frontal sinus tenderness. Left sinus exhibits no maxillary sinus tenderness and no frontal sinus tenderness.    Mouth/Throat type (hcc)  Stable cpm  On O2  3 L   CPM    Cellulitis of right lower extremity  Resolved    Elastic socks   Keep stationary   Bike qd     Orders Placed This Encounter      Comp Metabolic Panel (14) [E]      Fluad High Dose 72 ys and older    Meds This [employer]

## 2017-09-26 ENCOUNTER — TELEPHONE (OUTPATIENT)
Dept: INTERNAL MEDICINE CLINIC | Facility: CLINIC | Age: 82
End: 2017-09-26

## 2017-09-26 NOTE — TELEPHONE ENCOUNTER
Patient's brother called in stating that the patient has been receiving home health nurse visits for baths, etc. Patient's brother was certain that these were orders that were approved on behalf of the doctor.  However, patient is stating that medicare isn'

## 2017-09-26 NOTE — TELEPHONE ENCOUNTER
I never ordered  Those  Services  howewer  I found  Order   From Dr Gladys Diaz  On 2/17      order  From Dr Eunice Galindo RN to check blood pressure and oxygen levels  Physical therapy to stabilize walking  Home health aide to help with per

## 2017-09-27 NOTE — TELEPHONE ENCOUNTER
Also ranitidine  150   Bid before  Meals  30 in  As needed  For heartburns .   2  Weeks  Supply discussed with pharmacist

## 2017-09-27 NOTE — TELEPHONE ENCOUNTER
Home health nurses do not give baths. That would be home health aides.  Medical insurance only pays for home health aides when a skilled service such as an RN or PT are in the home for a medical reason supervising the aide (wound care, PT, blood draws, etc.

## 2017-10-16 ENCOUNTER — TELEPHONE (OUTPATIENT)
Dept: OTHER | Age: 82
End: 2017-10-16

## 2017-10-16 NOTE — TELEPHONE ENCOUNTER
Discussed  With  [address] [place] [address] patient to reduce the pressure of small erythema  Change position every 2 hours and was sitting for prolonged period of time  Apply  Sliver trible ant  Ointment -  2 weeks   Any worsening to call wound care nurse to rosy

## 2017-10-16 NOTE — TELEPHONE ENCOUNTER
Dr. Lelo Corrigan I received a message from [facility]. Cherrie Gonzalez stated that pt has a red area on the right lower buttock. The red area measures 1 1/2 x1 1/2. Cherrie Gonzalez does see a small section to be raised. Pt decline pain but it she does have some discomfort.  Rn note

## 2017-10-26 RX ORDER — LEVOTHYROXINE SODIUM 0.07 MG/1
TABLET ORAL
Qty: 90 TABLET | Refills: 0 | Status: SHIPPED | OUTPATIENT
Start: 2017-10-26 | End: 2018-01-24

## 2017-10-26 NOTE — TELEPHONE ENCOUNTER
Hypothyroid Medications  Protocol Criteria:  Appointment scheduled in the past 12 months or the next 3 months  TSH resulted in the past 12 months that is normal  Recent Outpatient Visits            1 month ago Essential hypertension    [facility] [place], [facility], [place]

## 2017-10-29 PROBLEM — D72.9 NEUTROPHILIC LEUKOCYTOSIS: Status: ACTIVE | Noted: 2017-10-29

## 2017-10-29 PROBLEM — T07.XXXA MULTIPLE FRACTURES: Status: ACTIVE | Noted: 2017-10-29

## 2017-10-29 PROBLEM — Z86.39 HISTORY OF IRON DEFICIENCY: Status: ACTIVE | Noted: 2017-10-29

## 2017-10-29 PROBLEM — R91.8 PULMONARY NODULES: Status: ACTIVE | Noted: 2017-10-29

## 2017-10-29 NOTE — PROGRESS NOTES
KEVIN Toledo is a 80year old female referred for evaluation of an elevated WBC count to 14,000 with a normal differential hemoglobin and platelet count. Patient is not aware of fever, chills, or obvious infection.   Patient has a history of a barrera Take 1,000 mcg by mouth daily. Disp:  Rfl:    Coenzyme Q10 (COQ10) 100 MG Oral Cap Take 100 mg by mouth daily. Disp:  Rfl:    Cobalamine Combinations (VITAMIN B12-FOLIC ACID OR) Take 1 tablet by mouth daily.    Disp:  Rfl:    Resveratrol 100 MG Oral Cap surgically repaired   • Hearing impairment    • High blood pressure    • History of chickenpox     per NG   • History of measles, mumps, or rubella     Measles and Mumps; per NG   • History of pneumonia     per NG   • History of pneumothorax 2011    per NG rhythm. Pulmonary/Chest: Effort normal and breath sounds normal. No respiratory distress. 3 L of O2 nasal cannula   Abdominal: Soft. Bowel sounds are normal. She exhibits no distension and no mass.    Lymphadenopathy:     She has no cervical adenopathy Eosinophils %      % 2 2 4   Basophils %      % 1 1 1   NEUTROPHILS #      1.8 - 7.7 K/UL  5.6 7.0   LYMPHOCYTES #      1.0 - 4.0 K/UL  1.9 1.9   MONOCYTES #      0.0 - 1.0 K/UL  0.9 0.9   EOSINOPHILS #      0.0 - 0.7 K/UL  0.2 0.4   BASOPHILS #      0.0 calcifications. Trace pericardial effusion. MEDIASTINUM/ISAC:        Decreased size of mediastinal lymphadenopathy.  For                     example precarinal lymph node now measures 8 mm short                     axis, previously 13 m hernia.     CONCLUSION:        1. No significant change in size of spiculated nodule in the left upper     lobe for technical differences. This abuts the pleural surface. Continued followup is advised as underlying malignancy is not excluded.        2.

## 2017-11-14 RX ORDER — TIOTROPIUM BROMIDE 18 UG/1
CAPSULE ORAL; RESPIRATORY (INHALATION)
Qty: 90 CAPSULE | Refills: 0 | Status: SHIPPED | OUTPATIENT
Start: 2017-11-14 | End: 2018-02-11

## 2017-11-14 NOTE — TELEPHONE ENCOUNTER
Refilled per protocol    Refill Protocol Appointment Criteria  · Appointment scheduled in the past 6 months or in the next 3 months  Recent Outpatient Visits            1 month ago Essential hypertension    [facility], [facility], [address] [facility]

## 2017-11-20 ENCOUNTER — OFFICE VISIT (OUTPATIENT)
Dept: DERMATOLOGY CLINIC | Facility: CLINIC | Age: 82
End: 2017-11-20

## 2017-11-20 DIAGNOSIS — L21.9 SEBORRHEIC DERMATITIS: ICD-10-CM

## 2017-11-20 DIAGNOSIS — Z87.2 HISTORY OF ACTINIC KERATOSES: Primary | ICD-10-CM

## 2017-11-20 PROCEDURE — G0463 HOSPITAL OUTPT CLINIC VISIT: HCPCS | Performed by: DERMATOLOGY

## 2017-11-20 PROCEDURE — 99212 OFFICE O/P EST SF 10 MIN: CPT | Performed by: DERMATOLOGY

## 2017-11-20 RX ORDER — IPRATROPIUM BROMIDE AND ALBUTEROL SULFATE 2.5; .5 MG/3ML; MG/3ML
SOLUTION RESPIRATORY (INHALATION)
Qty: 450 ML | Refills: 4 | Status: SHIPPED | OUTPATIENT
Start: 2017-11-20 | End: 2019-01-29

## 2017-11-20 NOTE — PROGRESS NOTES
HPI:     Chief Complaint     Derm Problem        HPI     Derm Problem    Additional comments: LOV 10/06/16 pt was seen for AK on her face and arms pt here for upper body check pt has no new spots pt has personal Hx of AK's LIFESCAPE       Last edited by Ambrocio Hicks daily.   Disp:  Rfl:    PATIENT SUPPLIED MEDICATION Smoothie included: Lechhin, Hemp Seed, Alex Seed, Whey Protein, Brewers Yeast, Flax Seed Disp:  Rfl:    TRIAMTERENE-HCTZ 37.5-25 MG Oral Cap TAKE 1 CAPSULE DAILY Disp: 90 capsule Rfl: 2   SEREVENT DISKUS file     Other Topics Concern    Caffeine Concern Yes    Comment: Coffee, Green tea, 5 cups; per NG    Pt has a pacemaker No    Pt has a defibrillator No    Reaction to local anesthetic No     Social History Narrative   None on file     Family History   Pr

## 2017-12-01 ENCOUNTER — TELEPHONE (OUTPATIENT)
Dept: OTHER | Age: 82
End: 2017-12-01

## 2017-12-01 DIAGNOSIS — R53.81 PHYSICAL DECONDITIONING: Primary | ICD-10-CM

## 2017-12-01 NOTE — TELEPHONE ENCOUNTER
Pt brother called and states pt had [address] and someone who came to assist pt with showering.     Pt brother states no one helps him anymore and pt has dementia, pt brother is having trouble caring for pt and is asking if home health can assist again

## 2017-12-01 NOTE — TELEPHONE ENCOUNTER
We will request home health services to come and evaluate patient -deconditioning , help with bathing .-  Please fax the order for home health

## 2017-12-04 NOTE — TELEPHONE ENCOUNTER
Pt's brother was contacted and informed that the information has been sent to [facility] and that he should be receiving a call. Understanding was voiced.

## 2017-12-05 NOTE — TELEPHONE ENCOUNTER
Massimo Quiroz with [facility] called to report unable to accept the pt due to pt lives outside of service area.

## 2017-12-07 NOTE — TELEPHONE ENCOUNTER
All document was faxed to [facility], the [facility] that pt had previously had to the intake department. Confirmation received.

## 2017-12-22 RX ORDER — SALMETEROL XINAFOATE 50 MCG
BLISTER, WITH INHALATION DEVICE INHALATION
Qty: 180 EACH | Refills: 1 | Status: SHIPPED | OUTPATIENT
Start: 2017-12-22

## 2017-12-22 NOTE — TELEPHONE ENCOUNTER
rx refilled as per written protocol.   Refill Protocol Appointment Criteria  · Appointment scheduled in the past 6 months or in the next 3 months  Recent Outpatient Visits            1 month ago History of actinic keratoses    [address] [facility]

## 2017-12-28 ENCOUNTER — TELEPHONE (OUTPATIENT)
Dept: INTERNAL MEDICINE CLINIC | Facility: CLINIC | Age: 82
End: 2017-12-28

## 2017-12-28 NOTE — TELEPHONE ENCOUNTER
Needs the last ov notes ( face to face ) medications DX    for home health.    Fax [phone] Pictures were placed in Pt's chart today for future reference.

## 2017-12-29 NOTE — TELEPHONE ENCOUNTER
Attempted to contact Alin Arenas at [facility] but no answer. Last OV, insurance, and demographics was faxed to number provided. Confirmatio was received.

## 2018-01-05 ENCOUNTER — TELEPHONE (OUTPATIENT)
Dept: OTHER | Age: 83
End: 2018-01-05

## 2018-01-05 DIAGNOSIS — I10 ESSENTIAL HYPERTENSION: Primary | ICD-10-CM

## 2018-01-05 DIAGNOSIS — J20.9 COPD (CHRONIC OBSTRUCTIVE PULMONARY DISEASE) WITH ACUTE BRONCHITIS (HCC): ICD-10-CM

## 2018-01-05 DIAGNOSIS — I82.4Y9 DEEP VEIN THROMBOSIS (DVT) OF PROXIMAL LOWER EXTREMITY, UNSPECIFIED CHRONICITY, UNSPECIFIED LATERALITY (HCC): ICD-10-CM

## 2018-01-05 DIAGNOSIS — E55.9 VITAMIN D DEFICIENCY: ICD-10-CM

## 2018-01-05 DIAGNOSIS — M81.0 OSTEOPOROSIS, UNSPECIFIED OSTEOPOROSIS TYPE, UNSPECIFIED PATHOLOGICAL FRACTURE PRESENCE: ICD-10-CM

## 2018-01-05 DIAGNOSIS — J44.0 COPD (CHRONIC OBSTRUCTIVE PULMONARY DISEASE) WITH ACUTE BRONCHITIS (HCC): ICD-10-CM

## 2018-01-05 NOTE — TELEPHONE ENCOUNTER
Patient's  states received an order in the mail for patient to get a CMP completed. In reviewing EPIC do see an order for a CMP that was ordered 9-18-17, not yet completed.      Do not see any documentation in regards to patient needing to come i

## 2018-01-05 NOTE — TELEPHONE ENCOUNTER
Please advise to the communication below. Lab was ordered 09/17. Thank you. Rohan López Please respond to pool: EM IM LMB LPN/CMA

## 2018-01-08 NOTE — TELEPHONE ENCOUNTER
Message was left on voicemail to return call. Please send call to [phone] 01/09/18 and ask for [facility].

## 2018-01-09 NOTE — TELEPHONE ENCOUNTER
Spoke to pt's brother and it was stated that no home health has been to their home. Pt's brother was informed that last [facility] will be contacted, understanding was voiced. [facility] was contacted and forms that was needed to be faxed.  Forms pulled

## 2018-01-11 ENCOUNTER — LAB ENCOUNTER (OUTPATIENT)
Dept: LAB | Age: 83
End: 2018-01-11
Attending: INTERNAL MEDICINE
Payer: MEDICARE

## 2018-01-11 DIAGNOSIS — I10 ESSENTIAL HYPERTENSION: ICD-10-CM

## 2018-01-11 DIAGNOSIS — E55.9 VITAMIN D DEFICIENCY: ICD-10-CM

## 2018-01-11 LAB
ALBUMIN SERPL BCP-MCNC: 3.9 G/DL (ref 3.5–4.8)
ALBUMIN/GLOB SERPL: 1.1 {RATIO} (ref 1–2)
ALP SERPL-CCNC: 46 U/L (ref 32–100)
ALT SERPL-CCNC: 14 U/L (ref 14–54)
ANION GAP SERPL CALC-SCNC: 11 MMOL/L (ref 0–18)
AST SERPL-CCNC: 24 U/L (ref 15–41)
BASOPHILS # BLD: 0.1 K/UL (ref 0–0.2)
BASOPHILS NFR BLD: 1 %
BILIRUB SERPL-MCNC: 0.7 MG/DL (ref 0.3–1.2)
BUN SERPL-MCNC: 26 MG/DL (ref 8–20)
BUN/CREAT SERPL: 23.2 (ref 10–20)
CALCIUM SERPL-MCNC: 9.6 MG/DL (ref 8.5–10.5)
CHLORIDE SERPL-SCNC: 95 MMOL/L (ref 95–110)
CO2 SERPL-SCNC: 34 MMOL/L (ref 22–32)
CREAT SERPL-MCNC: 1.12 MG/DL (ref 0.5–1.5)
EOSINOPHIL # BLD: 0.2 K/UL (ref 0–0.7)
EOSINOPHIL NFR BLD: 2 %
ERYTHROCYTE [DISTWIDTH] IN BLOOD BY AUTOMATED COUNT: 13.2 % (ref 11–15)
GLOBULIN PLAS-MCNC: 3.7 G/DL (ref 2.5–3.7)
GLUCOSE SERPL-MCNC: 103 MG/DL (ref 70–99)
HCT VFR BLD AUTO: 41.8 % (ref 35–48)
HGB BLD-MCNC: 13.7 G/DL (ref 12–16)
LYMPHOCYTES # BLD: 1.8 K/UL (ref 1–4)
LYMPHOCYTES NFR BLD: 18 %
MCH RBC QN AUTO: 30.4 PG (ref 27–32)
MCHC RBC AUTO-ENTMCNC: 32.9 G/DL (ref 32–37)
MCV RBC AUTO: 92.5 FL (ref 80–100)
MONOCYTES # BLD: 1 K/UL (ref 0–1)
MONOCYTES NFR BLD: 10 %
NEUTROPHILS # BLD AUTO: 6.8 K/UL (ref 1.8–7.7)
NEUTROPHILS NFR BLD: 69 %
OSMOLALITY UR CALC.SUM OF ELEC: 295 MOSM/KG (ref 275–295)
PLATELET # BLD AUTO: 210 K/UL (ref 140–400)
PMV BLD AUTO: 10 FL (ref 7.4–10.3)
POTASSIUM SERPL-SCNC: 3.4 MMOL/L (ref 3.3–5.1)
PROT SERPL-MCNC: 7.6 G/DL (ref 5.9–8.4)
RBC # BLD AUTO: 4.52 M/UL (ref 3.7–5.4)
SODIUM SERPL-SCNC: 140 MMOL/L (ref 136–144)
TSH SERPL-ACNC: 2.36 UIU/ML (ref 0.45–5.33)
WBC # BLD AUTO: 9.8 K/UL (ref 4–11)

## 2018-01-11 PROCEDURE — 84443 ASSAY THYROID STIM HORMONE: CPT

## 2018-01-11 PROCEDURE — 85025 COMPLETE CBC W/AUTO DIFF WBC: CPT

## 2018-01-11 PROCEDURE — 80053 COMPREHEN METABOLIC PANEL: CPT

## 2018-01-11 PROCEDURE — 36415 COLL VENOUS BLD VENIPUNCTURE: CPT

## 2018-01-11 PROCEDURE — 82306 VITAMIN D 25 HYDROXY: CPT

## 2018-01-12 LAB — 25(OH)D3 SERPL-MCNC: 76.6 NG/ML

## 2018-01-12 NOTE — TELEPHONE ENCOUNTER
Spoke with Neto at [facility] and it was stated that another home health agency has been sent to the pt's home and there was some confusion because they have been seeing the pt and was in the process of going back out today.  Neto was informed that t

## 2018-01-13 NOTE — TELEPHONE ENCOUNTER
Brother Pascual William called to confirm that Grazyna Newton will be taking care of his sister, he wants to go with [facility], advised that this was addressed yesterday, he will call Jacobo Long at [facility] to make sure all resolved, advised to call back if needs further assistanc

## 2018-01-25 RX ORDER — TRIAMTERENE AND HYDROCHLOROTHIAZIDE 37.5; 25 MG/1; MG/1
CAPSULE ORAL
Qty: 90 CAPSULE | Refills: 0 | Status: SHIPPED | OUTPATIENT
Start: 2018-01-25 | End: 2018-04-25

## 2018-01-26 RX ORDER — LEVOTHYROXINE SODIUM 0.07 MG/1
TABLET ORAL
Qty: 90 TABLET | Refills: 0 | Status: SHIPPED | OUTPATIENT
Start: 2018-01-26 | End: 2018-04-24

## 2018-01-26 NOTE — TELEPHONE ENCOUNTER
Refilled x1   PER LOV notes  Stable  Since is bp is  lower and no  Swelling   Of legs  Can  Try   Triamterene  /hctz  every other  Day   Labs in    6-8 weeks   Hypertensive Medications  Protocol Criteria:  · Appointment scheduled in the past 6 months or in

## 2018-01-26 NOTE — TELEPHONE ENCOUNTER
Hypothyroid Medications  Protocol Criteria:  Appointment scheduled in the past 12 months or the next 3 months  TSH resulted in the past 12 months that is normal  Recent Outpatient Visits            2 months ago History of actinic keratoses    [facility]

## 2018-02-06 ENCOUNTER — TELEPHONE (OUTPATIENT)
Dept: INTERNAL MEDICINE CLINIC | Facility: CLINIC | Age: 83
End: 2018-02-06

## 2018-02-06 NOTE — TELEPHONE ENCOUNTER
Soha Alvares at [facility] requesting updated progress notes dated dec or jan \" face to face\" for medicare

## 2018-02-07 NOTE — TELEPHONE ENCOUNTER
Attempted to contact Ovi Gregorio again but no answer and no voicemail. If return call please send to [phone] until 5:00 today and ask for [facility]. Thank you.

## 2018-02-08 NOTE — TELEPHONE ENCOUNTER
Toni Galeazzi was contacted and informed that pt has not been seen since last year and next appt not until 03/18. It was stated that she will call the pt to get them to schedule an earlier appt.

## 2018-02-12 RX ORDER — TIOTROPIUM BROMIDE 18 UG/1
CAPSULE ORAL; RESPIRATORY (INHALATION)
Qty: 90 CAPSULE | Refills: 0 | Status: SHIPPED | OUTPATIENT
Start: 2018-02-12 | End: 2018-05-13

## 2018-03-19 ENCOUNTER — OFFICE VISIT (OUTPATIENT)
Dept: INTERNAL MEDICINE CLINIC | Facility: CLINIC | Age: 83
End: 2018-03-19

## 2018-03-19 VITALS
SYSTOLIC BLOOD PRESSURE: 114 MMHG | DIASTOLIC BLOOD PRESSURE: 75 MMHG | RESPIRATION RATE: 20 BRPM | HEIGHT: 68 IN | HEART RATE: 88 BPM | TEMPERATURE: 98 F | OXYGEN SATURATION: 93 %

## 2018-03-19 DIAGNOSIS — J44.9 CHRONIC OBSTRUCTIVE PULMONARY DISEASE, UNSPECIFIED COPD TYPE (HCC): ICD-10-CM

## 2018-03-19 DIAGNOSIS — I10 ESSENTIAL HYPERTENSION: ICD-10-CM

## 2018-03-19 DIAGNOSIS — R58 ECCHYMOSIS: ICD-10-CM

## 2018-03-19 DIAGNOSIS — Z23 NEED FOR PNEUMOCOCCAL VACCINATION: Primary | ICD-10-CM

## 2018-03-19 PROCEDURE — G0463 HOSPITAL OUTPT CLINIC VISIT: HCPCS | Performed by: INTERNAL MEDICINE

## 2018-03-19 PROCEDURE — G0009 ADMIN PNEUMOCOCCAL VACCINE: HCPCS | Performed by: INTERNAL MEDICINE

## 2018-03-19 PROCEDURE — 90732 PPSV23 VACC 2 YRS+ SUBQ/IM: CPT | Performed by: INTERNAL MEDICINE

## 2018-03-19 PROCEDURE — 99214 OFFICE O/P EST MOD 30 MIN: CPT | Performed by: INTERNAL MEDICINE

## 2018-03-19 NOTE — PROGRESS NOTES
HPI:    Patient ID: Eb Cevallos is a 80year old female.     Hypertension   This is a chronic (pt  stat e feels  good she  sais \"  still here  \"and laughing pt denies any  pain  and   denies   cp sob  palpitaitions     , use  walker to  walk  at  home SEREVENT DISKUS 50 MCG/DOSE Inhalation Aerosol Powder, Breath Activated USE 1 INHALATION TWICE DAILY Disp: 180 each Rfl: 1   ipratropium-albuterol 0.5-2.5 (3) MG/3ML Inhalation Solution USE 1 VIAL VIA NEBULIZER EVERY 4 HOURS AS NEEDED Disp: 450 mL Rfl: 4 Right Ear: Tympanic membrane, external ear and ear canal normal.   Left Ear: Tympanic membrane, external ear and ear canal normal.   Nose: Nose normal. Right sinus exhibits no maxillary sinus tenderness and no frontal sinus tenderness.  Left sinus exhibits Low- sodium diet (2grams per day)   Maintain a low fat diet   Maintain ideal weight   Regular walking/exercise as tolerated   Track and record blood pressure at home   The risks and benefits of treatment plan with discussed with patient   Patient verbalize

## 2018-03-23 ENCOUNTER — TELEPHONE (OUTPATIENT)
Dept: INTERNAL MEDICINE CLINIC | Facility: CLINIC | Age: 83
End: 2018-03-23

## 2018-04-27 RX ORDER — LEVOTHYROXINE SODIUM 0.07 MG/1
TABLET ORAL
Qty: 90 TABLET | Refills: 0 | Status: SHIPPED | OUTPATIENT
Start: 2018-04-27 | End: 2018-07-24

## 2018-04-27 NOTE — TELEPHONE ENCOUNTER
Hypothyroid Medications  Protocol Criteria:  Appointment scheduled in the past 12 months or the next 3 months  TSH resulted in the past 12 months that is normal  Recent Outpatient Visits            1 month ago Need for pneumococcal vaccination    Luis

## 2018-04-30 RX ORDER — TRIAMTERENE AND HYDROCHLOROTHIAZIDE 37.5; 25 MG/1; MG/1
CAPSULE ORAL
Qty: 90 CAPSULE | Refills: 0 | Status: SHIPPED | OUTPATIENT
Start: 2018-04-30 | End: 2018-07-24

## 2018-04-30 NOTE — TELEPHONE ENCOUNTER
Hypertensive Medications  Protocol Criteria:  · Appointment scheduled in the past 6 months or in the next 3 months  · BMP or CMP in the past 12 months  · Creatinine result < 2  Recent Outpatient Visits            1 month ago Need for pneumococcal vaccinati Pending Prescriptions Disp Refills    TRIAMTERENE-HCTZ 37.5-25 MG Oral Cap [Pharmacy Med Name: [place] [facility] 37.5/25 MG CAPS] 90 capsule 0     Sig: TAKE 1 CAPSULE DAILY         LR 1-25-18 # 90  Refill approved per protocol.

## 2018-05-14 RX ORDER — TIOTROPIUM BROMIDE 18 UG/1
CAPSULE ORAL; RESPIRATORY (INHALATION)
Qty: 90 CAPSULE | Refills: 0 | Status: SHIPPED | OUTPATIENT
Start: 2018-05-14 | End: 2018-08-12

## 2018-05-14 NOTE — TELEPHONE ENCOUNTER
Refill Protocol Appointment Criteria  · Appointment scheduled in the past 6 months or in the next 3 months  Recent Outpatient Visits            1 month ago Need for pneumococcal vaccination    [address], [address] [place],

## 2018-06-18 ENCOUNTER — APPOINTMENT (OUTPATIENT)
Dept: LAB | Age: 83
End: 2018-06-18
Attending: INTERNAL MEDICINE
Payer: MEDICARE

## 2018-06-18 ENCOUNTER — OFFICE VISIT (OUTPATIENT)
Dept: INTERNAL MEDICINE CLINIC | Facility: CLINIC | Age: 83
End: 2018-06-18

## 2018-06-18 VITALS
WEIGHT: 137.63 LBS | BODY MASS INDEX: 20.86 KG/M2 | HEART RATE: 80 BPM | RESPIRATION RATE: 20 BRPM | SYSTOLIC BLOOD PRESSURE: 90 MMHG | HEIGHT: 68 IN | DIASTOLIC BLOOD PRESSURE: 60 MMHG | TEMPERATURE: 98 F

## 2018-06-18 DIAGNOSIS — R09.02 HYPOXIA: ICD-10-CM

## 2018-06-18 DIAGNOSIS — E03.9 ACQUIRED HYPOTHYROIDISM: ICD-10-CM

## 2018-06-18 DIAGNOSIS — R41.3 MEMORY DISORDER: ICD-10-CM

## 2018-06-18 DIAGNOSIS — J43.0 UNILATERAL EMPHYSEMA (HCC): ICD-10-CM

## 2018-06-18 DIAGNOSIS — I10 ESSENTIAL HYPERTENSION: Primary | ICD-10-CM

## 2018-06-18 DIAGNOSIS — E55.9 VITAMIN D DEFICIENCY: ICD-10-CM

## 2018-06-18 DIAGNOSIS — Z74.2 NEED FOR HOME HEALTH CARE: ICD-10-CM

## 2018-06-18 PROCEDURE — 36415 COLL VENOUS BLD VENIPUNCTURE: CPT

## 2018-06-18 PROCEDURE — 82607 VITAMIN B-12: CPT

## 2018-06-18 PROCEDURE — 99214 OFFICE O/P EST MOD 30 MIN: CPT | Performed by: INTERNAL MEDICINE

## 2018-06-18 PROCEDURE — G0463 HOSPITAL OUTPT CLINIC VISIT: HCPCS | Performed by: INTERNAL MEDICINE

## 2018-06-18 NOTE — PROGRESS NOTES
HPI:    Patient ID: Maryan Gamino is a 80year old female. Hypertension   This is a chronic (pt  state   doing  well    but has some  memmory problems -    brother   is  POA -  ) problem. The current episode started more than 1 year ago.  The problem is TWICE DAILY Disp: 180 each Rfl: 1   ipratropium-albuterol 0.5-2.5 (3) MG/3ML Inhalation Solution USE 1 VIAL VIA NEBULIZER EVERY 4 HOURS AS NEEDED Disp: 450 mL Rfl: 4   Albuterol Sulfate HFA (VENTOLIN HFA) 108 (90 Base) MCG/ACT Inhalation Aero Soln Inhale 2 ear and ear canal normal.   Nose: Nose normal. Right sinus exhibits no maxillary sinus tenderness and no frontal sinus tenderness. Left sinus exhibits no maxillary sinus tenderness and no frontal sinus tenderness.    Mouth/Throat: Uvula is midline and oroph plan with discussed with patient   Patient verbalizes understanding  Pt   Only taking   triam  /hct  -- Every  Other  Day      bp  Today  Is  Low  90 /60 Will  D/c - medication  Advised  Pt   If   bp  140-150 /80   To  Restart  medicaiton  1/2  Tab -  1  T

## 2018-07-03 ENCOUNTER — TELEPHONE (OUTPATIENT)
Dept: INTERNAL MEDICINE CLINIC | Facility: CLINIC | Age: 83
End: 2018-07-03

## 2018-07-03 DIAGNOSIS — Z99.81 CHRONIC RESPIRATORY FAILURE WITH HYPOXIA, ON HOME O2 THERAPY (HCC): ICD-10-CM

## 2018-07-03 DIAGNOSIS — J96.11 CHRONIC RESPIRATORY FAILURE WITH HYPOXIA, ON HOME O2 THERAPY (HCC): ICD-10-CM

## 2018-07-03 DIAGNOSIS — R53.81 PHYSICAL DECONDITIONING: Primary | ICD-10-CM

## 2018-07-03 NOTE — TELEPHONE ENCOUNTER
Pt brother is  Calling to follow up on the [address] order that  was suppose to have for his sister.     Brother stts he hasn't heard anything or no follow up about it         Please advise

## 2018-07-05 NOTE — TELEPHONE ENCOUNTER
Nurses   please    Fax  Order  For   [id number] [place]  To  lorena patient   - referral in system

## 2018-07-07 NOTE — TELEPHONE ENCOUNTER
Left detailed vm notifying family that Home health orders are available for pick-up at [facility] front office or if preferred to please return our call back to office with [facility] fax number.

## 2018-07-18 ENCOUNTER — TELEPHONE (OUTPATIENT)
Dept: NEUROLOGY | Facility: CLINIC | Age: 83
End: 2018-07-18

## 2018-07-18 ENCOUNTER — APPOINTMENT (OUTPATIENT)
Dept: LAB | Facility: HOSPITAL | Age: 83
End: 2018-07-18
Attending: Other
Payer: MEDICARE

## 2018-07-18 ENCOUNTER — OFFICE VISIT (OUTPATIENT)
Dept: NEUROLOGY | Facility: CLINIC | Age: 83
End: 2018-07-18
Payer: MEDICARE

## 2018-07-18 ENCOUNTER — TELEPHONE (OUTPATIENT)
Dept: PULMONOLOGY | Facility: CLINIC | Age: 83
End: 2018-07-18

## 2018-07-18 ENCOUNTER — MED REC SCAN ONLY (OUTPATIENT)
Dept: NEUROLOGY | Facility: CLINIC | Age: 83
End: 2018-07-18

## 2018-07-18 VITALS
HEART RATE: 62 BPM | DIASTOLIC BLOOD PRESSURE: 58 MMHG | SYSTOLIC BLOOD PRESSURE: 100 MMHG | HEIGHT: 66.5 IN | WEIGHT: 137 LBS | BODY MASS INDEX: 21.76 KG/M2

## 2018-07-18 DIAGNOSIS — R41.3 MEMORY DISORDER: Primary | ICD-10-CM

## 2018-07-18 DIAGNOSIS — G30.1 LATE ONSET ALZHEIMER'S DISEASE WITHOUT BEHAVIORAL DISTURBANCE (HCC): ICD-10-CM

## 2018-07-18 DIAGNOSIS — R41.3 MEMORY DISORDER: ICD-10-CM

## 2018-07-18 DIAGNOSIS — F02.80 LATE ONSET ALZHEIMER'S DISEASE WITHOUT BEHAVIORAL DISTURBANCE (HCC): ICD-10-CM

## 2018-07-18 LAB
FOLATE SERPL-MCNC: >24 NG/ML
TSH SERPL-ACNC: 2.72 UIU/ML (ref 0.45–5.33)
VIT B12 SERPL-MCNC: 562 PG/ML (ref 181–914)

## 2018-07-18 PROCEDURE — 82607 VITAMIN B-12: CPT

## 2018-07-18 PROCEDURE — 99204 OFFICE O/P NEW MOD 45 MIN: CPT | Performed by: OTHER

## 2018-07-18 PROCEDURE — 36415 COLL VENOUS BLD VENIPUNCTURE: CPT

## 2018-07-18 PROCEDURE — 84443 ASSAY THYROID STIM HORMONE: CPT

## 2018-07-18 PROCEDURE — 82746 ASSAY OF FOLIC ACID SERUM: CPT

## 2018-07-18 NOTE — PROGRESS NOTES
Neurology Initial Visit     Referred By: Dr. Harrison ref. provider found    Chief Complaint: Patient presents with:  Memory Loss: Patient presents for a c/o memory loss.  She presents with her brother Julian Larose who states her memory has been declining within the or equivalent per week   Comment: very rarely        Drug use: No       Family History   Problem Relation Age of Onset   • Hypertension Mother      per NG         Current Outpatient Prescriptions:   •  SPIRIVA HANDIHALER 18 MCG Inhalation Cap, INHALE THE C Coenzyme Q10 (COQ10) 100 MG Oral Cap, Take 100 mg by mouth daily.   , Disp: , Rfl:   •  Losartan Potassium (COZAAR) 50 MG Oral Tab, TAKE 1 TABLET DAILY, TAKE TWICE A DAY IF BLOOD PRESSURE GREATER THAN 150/90, Disp: 14 tablet, Rfl: 0      Penicillins Results  Component Value Date   TSH 2.36 01/11/2018       Lab Results  Component Value Date   HDL 77 05/24/2017    (H) 05/24/2017   TRIG 111 05/24/2017       Lab Results  Component Value Date   HGB 13.7 01/11/2018   HCT 41.8 01/11/2018   MCV 92.5 01

## 2018-07-18 NOTE — PATIENT INSTRUCTIONS
As of October 6th 2014, the Drug Enforcement Agency [facility]) is reclassifying all hydrocodone combination medications from Schedule III to Schedule II. This includes medications such as Norco, Vicodin, Lortab, Zohydro, and Vicoprofen.     What this means for y

## 2018-07-18 NOTE — TELEPHONE ENCOUNTER
Medicare Online for insurance coverage of [employer] was verified and procedure  is a covered benefit and does not require authorization.     Will call patient to inform of the Approval for CT Brain, L/M for patient to c/b with any questions or co

## 2018-07-19 NOTE — TELEPHONE ENCOUNTER
Pt  notified that parking placard form has been filled out by [facility]. However Dr. Hortencia Steiner did not sign for meter exemption nor relative of pt placard.  stts to mail the form to [facility]. Form placed in the bin to be mailed.

## 2018-07-23 ENCOUNTER — HOSPITAL ENCOUNTER (OUTPATIENT)
Dept: CT IMAGING | Facility: HOSPITAL | Age: 83
Discharge: HOME | End: 2018-07-23
Attending: Other
Payer: MEDICARE

## 2018-07-23 ENCOUNTER — TELEPHONE (OUTPATIENT)
Dept: NEUROLOGY | Facility: CLINIC | Age: 83
End: 2018-07-23

## 2018-07-23 DIAGNOSIS — R41.3 MEMORY DISORDER: ICD-10-CM

## 2018-07-23 PROCEDURE — 70450 CT HEAD/BRAIN W/O DYE: CPT | Performed by: OTHER

## 2018-07-23 NOTE — TELEPHONE ENCOUNTER
----- Message from Varun Lubin MD sent at 7/23/2018 12:55 PM CDT -----  Please let patient know that CT head didn't show significant abnormalities to explain the memory loss, specifically no evidence of strokes, buildup of fluid or tumors.   She wa

## 2018-07-24 RX ORDER — TRIAMTERENE AND HYDROCHLOROTHIAZIDE 37.5; 25 MG/1; MG/1
CAPSULE ORAL
Qty: 90 CAPSULE | Refills: 0 | Status: SHIPPED | OUTPATIENT
Start: 2018-07-24 | End: 2018-10-21

## 2018-07-24 NOTE — TELEPHONE ENCOUNTER
Hypertensive Medications  Protocol Criteria:  · Appointment scheduled in the past 6 months or in the next 3 months  · BMP or CMP in the past 12 months  · Creatinine result < 2  Recent Outpatient Visits            6 days ago Memory disorder    Monserrat Damon

## 2018-07-25 RX ORDER — LEVOTHYROXINE SODIUM 0.07 MG/1
TABLET ORAL
Qty: 90 TABLET | Refills: 0 | Status: SHIPPED | OUTPATIENT
Start: 2018-07-25 | End: 2018-10-23

## 2018-07-25 NOTE — TELEPHONE ENCOUNTER
rx refilled as per written protocol.   Hypothyroid Medications  Protocol Criteria:  Appointment scheduled in the past 12 months or the next 3 months  TSH resulted in the past 12 months that is normal  Recent Outpatient Visits            1 week ago Memory

## 2018-08-09 ENCOUNTER — OFFICE VISIT (OUTPATIENT)
Dept: PULMONOLOGY | Facility: CLINIC | Age: 83
End: 2018-08-09
Payer: MEDICARE

## 2018-08-09 VITALS
RESPIRATION RATE: 18 BRPM | DIASTOLIC BLOOD PRESSURE: 81 MMHG | OXYGEN SATURATION: 82 % | HEIGHT: 66 IN | SYSTOLIC BLOOD PRESSURE: 115 MMHG | HEART RATE: 87 BPM | BODY MASS INDEX: 22.02 KG/M2 | WEIGHT: 137 LBS

## 2018-08-09 DIAGNOSIS — R91.1 PULMONARY NODULE: ICD-10-CM

## 2018-08-09 DIAGNOSIS — J44.9 CHRONIC OBSTRUCTIVE PULMONARY DISEASE, UNSPECIFIED COPD TYPE (HCC): Primary | ICD-10-CM

## 2018-08-09 PROCEDURE — G0463 HOSPITAL OUTPT CLINIC VISIT: HCPCS | Performed by: INTERNAL MEDICINE

## 2018-08-09 PROCEDURE — 99213 OFFICE O/P EST LOW 20 MIN: CPT | Performed by: INTERNAL MEDICINE

## 2018-08-09 PROCEDURE — 94761 N-INVAS EAR/PLS OXIMETRY MLT: CPT | Performed by: INTERNAL MEDICINE

## 2018-08-09 NOTE — PROGRESS NOTES
The patient is an 80-year-old female who I know well from prior evaluation comes in now for follow-up. She needs certification for ongoing oxygen supplementation.   She has a history of COPD with prior pneumonia and lung abscess which was stable to decreas

## 2018-08-14 ENCOUNTER — HOSPITAL ENCOUNTER (OUTPATIENT)
Dept: CT IMAGING | Facility: HOSPITAL | Age: 83
Discharge: HOME | End: 2018-08-14
Attending: INTERNAL MEDICINE
Payer: MEDICARE

## 2018-08-14 DIAGNOSIS — R91.1 PULMONARY NODULE: ICD-10-CM

## 2018-08-14 PROCEDURE — 71250 CT THORAX DX C-: CPT | Performed by: INTERNAL MEDICINE

## 2018-08-14 RX ORDER — TIOTROPIUM BROMIDE 18 UG/1
CAPSULE ORAL; RESPIRATORY (INHALATION)
Qty: 90 CAPSULE | Refills: 0 | Status: SHIPPED | OUTPATIENT
Start: 2018-08-14 | End: 2018-11-12

## 2018-08-14 NOTE — TELEPHONE ENCOUNTER
Refill Protocol Appointment Criteria  · Appointment scheduled in the past 6 months or in the next 3 months  Recent Outpatient Visits            5 days ago Chronic obstructive pulmonary disease, unspecified COPD type [facility])    [facility]

## 2018-08-24 ENCOUNTER — TELEPHONE (OUTPATIENT)
Dept: PULMONOLOGY | Facility: CLINIC | Age: 83
End: 2018-08-24

## 2018-08-24 DIAGNOSIS — R91.8 PULMONARY NODULES: Primary | ICD-10-CM

## 2018-08-24 NOTE — TELEPHONE ENCOUNTER
----- Message from Janell Warner MD sent at 8/24/2018 10:32 AM CDT -----  RN, please call the patient to let her know that she has new 2 tiny nodules at the right upper lobe. Everything else was stable compared to before.   At this juncture, we advise r

## 2018-08-24 NOTE — TELEPHONE ENCOUNTER
Brother Arabella Akers (NIKOLAS on file) informed of Dr. Valentina Shelley message/results/orders below. Pt verbalized understanding. Order added to chronic calendar.

## 2018-09-17 ENCOUNTER — OFFICE VISIT (OUTPATIENT)
Dept: INTERNAL MEDICINE CLINIC | Facility: CLINIC | Age: 83
End: 2018-09-17
Payer: MEDICARE

## 2018-09-17 VITALS
HEIGHT: 66 IN | HEART RATE: 66 BPM | DIASTOLIC BLOOD PRESSURE: 77 MMHG | TEMPERATURE: 97 F | SYSTOLIC BLOOD PRESSURE: 126 MMHG | WEIGHT: 138 LBS | BODY MASS INDEX: 22.18 KG/M2 | RESPIRATION RATE: 20 BRPM

## 2018-09-17 DIAGNOSIS — B35.1 TOENAIL FUNGUS: Primary | ICD-10-CM

## 2018-09-17 DIAGNOSIS — J44.9 CHRONIC OBSTRUCTIVE PULMONARY DISEASE, UNSPECIFIED COPD TYPE (HCC): ICD-10-CM

## 2018-09-17 DIAGNOSIS — I10 ESSENTIAL HYPERTENSION: ICD-10-CM

## 2018-09-17 PROCEDURE — 99214 OFFICE O/P EST MOD 30 MIN: CPT | Performed by: INTERNAL MEDICINE

## 2018-09-17 PROCEDURE — G0463 HOSPITAL OUTPT CLINIC VISIT: HCPCS | Performed by: INTERNAL MEDICINE

## 2018-09-17 NOTE — PROGRESS NOTES
HPI:    Patient ID: Stacia Bradley is a 80year old female. Patient presents with:  Hypertension: Pt is f/u with her blood pressure and both pt nor her brother know the names of the medication that the pt is taking.        Patient in office today for follow MCG/DOSE Inhalation Aerosol Powder, Breath Activated USE 1 INHALATION TWICE DAILY Disp: 180 each Rfl: 1   ipratropium-albuterol 0.5-2.5 (3) MG/3ML Inhalation Solution USE 1 VIAL VIA NEBULIZER EVERY 4 HOURS AS NEEDED Disp: 450 mL Rfl: 4   Albuterol Sulfate membrane and ear canal normal.   Nose: Right sinus exhibits no maxillary sinus tenderness and no frontal sinus tenderness. Left sinus exhibits no maxillary sinus tenderness and no frontal sinus tenderness. Mouth/Throat: Uvula is midline.  No posterior shun (hcc)  Stable  On  o2 3 L  -continuous   cpm   Orders Placed This Encounter      Comp Metabolic Panel (14) [E]      Meds This Visit:  Requested Prescriptions      No prescriptions requested or ordered in this encounter       Imaging & Referrals:  PODIATRY

## 2018-10-16 ENCOUNTER — APPOINTMENT (OUTPATIENT)
Dept: LAB | Age: 83
End: 2018-10-16
Attending: INTERNAL MEDICINE
Payer: MEDICARE

## 2018-10-16 DIAGNOSIS — I10 ESSENTIAL HYPERTENSION: ICD-10-CM

## 2018-10-16 PROCEDURE — 36415 COLL VENOUS BLD VENIPUNCTURE: CPT

## 2018-10-16 PROCEDURE — 80053 COMPREHEN METABOLIC PANEL: CPT

## 2018-10-16 NOTE — PROGRESS NOTES
Neurology follow-up visit     Referred By: Dr. Harrison ref. provider found    Chief Complaint: Patient presents with:  Memory Loss: LOV 7-18-18 pt is here with brother to f/u on memory loss.  per bother momery is worse since last office visit daily pt forget w Measles and Mumps; per NG   • History of pneumonia     per NG   • History of pneumothorax 2011    per NG   • Hypothyroidism     per NG   • Multiple fractures 10/29/2017   • Obesity, unspecified     per NG   • Pneumonia due to organism    • Skin disorder •  MAGNESIUM OR, Take 160 mg by mouth daily. , Disp: , Rfl:   •  Menaquinone-7 (VITAMIN K2 OR), Take 1,000 mcg by mouth daily. , Disp: , Rfl:   •  Coenzyme Q10 (COQ10) 100 MG Oral Cap, Take 100 mg by mouth daily.   , Disp: , Rfl:   •  Cobalamine Combina 05/24/2017     (H) 05/24/2017    TRIG 111 05/24/2017     Lab Results   Component Value Date    HGB 13.7 01/11/2018    HCT 41.8 01/11/2018    MCV 92.5 01/11/2018    WBC 9.8 01/11/2018     01/11/2018      Lab Results   Component Value Date    B

## 2018-10-22 RX ORDER — TRIAMTERENE AND HYDROCHLOROTHIAZIDE 37.5; 25 MG/1; MG/1
CAPSULE ORAL
Qty: 90 CAPSULE | Refills: 0 | Status: SHIPPED | OUTPATIENT
Start: 2018-10-22 | End: 2019-01-20

## 2018-10-23 RX ORDER — LEVOTHYROXINE SODIUM 0.07 MG/1
TABLET ORAL
Qty: 90 TABLET | Refills: 0 | Status: SHIPPED | OUTPATIENT
Start: 2018-10-23 | End: 2019-05-24

## 2018-10-23 NOTE — TELEPHONE ENCOUNTER
Refill passed per [facility], [facility] protocol.     Hypothyroid Medications  Protocol Criteria:  Appointment scheduled in the past 12 months or the next 3 months  TSH resulted in the past 12 months that is normal  Recent Outpatient Visits            1 week ago Late onset Alzheimer's disease without behavioral disturbance    MATTI Karimi, [address] [facility], [address], Alecia Peabody, MD    Office Visit    1 month ago Toenail fungus    Jared Left, Enoch Bell MD    Office Visit    2 months ago Chronic obstructive pulmonary disease, unspecified COPD type [facility])    Melina, [address] [facility], Joanne Berman MD    Office Visit    3 months ago Memory disorder    MATTI Karimi, [address] [facility], [address], Alecia Peabody, MD    Office Visit    4 months ago Essential hypertension    [facility], [address] MD    Office Visit        Future Appointments       Provider Department Appt Notes    In 3 weeks Belkis Stringer MD [facility], [facility], [address] 2mo f/u    In 5 months MD MATTI Ashraf, [address] [facility], [address] 6 mos f/u    In 9 months MD Melina [address] yearly        Lab Results   Component Value Date    TSH 2.72 07/18/2018

## 2018-10-23 NOTE — TELEPHONE ENCOUNTER
Hypertensive Medications  Protocol Criteria:  · Appointment scheduled in the past 6 months or in the next 3 months  · BMP or CMP in the past 12 months  · Creatinine result < 2  Recent Outpatient Visits            6 days ago Late onset Alzheimer's disease w protocol

## 2018-11-12 RX ORDER — TIOTROPIUM BROMIDE 18 UG/1
CAPSULE ORAL; RESPIRATORY (INHALATION)
Qty: 90 CAPSULE | Refills: 0 | Status: SHIPPED | OUTPATIENT
Start: 2018-11-12 | End: 2019-02-10

## 2018-11-15 ENCOUNTER — OFFICE VISIT (OUTPATIENT)
Dept: INTERNAL MEDICINE CLINIC | Facility: CLINIC | Age: 83
End: 2018-11-15
Payer: MEDICARE

## 2018-11-15 VITALS
HEART RATE: 92 BPM | HEIGHT: 66 IN | TEMPERATURE: 98 F | DIASTOLIC BLOOD PRESSURE: 75 MMHG | RESPIRATION RATE: 18 BRPM | SYSTOLIC BLOOD PRESSURE: 125 MMHG | BODY MASS INDEX: 22 KG/M2

## 2018-11-15 DIAGNOSIS — E03.9 ACQUIRED HYPOTHYROIDISM: ICD-10-CM

## 2018-11-15 DIAGNOSIS — R94.4 DECREASED GFR: ICD-10-CM

## 2018-11-15 DIAGNOSIS — I10 ESSENTIAL HYPERTENSION: Primary | ICD-10-CM

## 2018-11-15 DIAGNOSIS — Z23 INFLUENZA VACCINATION GIVEN: ICD-10-CM

## 2018-11-15 DIAGNOSIS — J44.9 CHRONIC OBSTRUCTIVE PULMONARY DISEASE, UNSPECIFIED COPD TYPE (HCC): ICD-10-CM

## 2018-11-15 DIAGNOSIS — B35.1 TOENAIL FUNGUS: ICD-10-CM

## 2018-11-15 PROCEDURE — 90653 IIV ADJUVANT VACCINE IM: CPT | Performed by: INTERNAL MEDICINE

## 2018-11-15 PROCEDURE — G0008 ADMIN INFLUENZA VIRUS VAC: HCPCS | Performed by: INTERNAL MEDICINE

## 2018-11-15 PROCEDURE — G0463 HOSPITAL OUTPT CLINIC VISIT: HCPCS | Performed by: INTERNAL MEDICINE

## 2018-11-15 PROCEDURE — 99214 OFFICE O/P EST MOD 30 MIN: CPT | Performed by: INTERNAL MEDICINE

## 2018-11-15 NOTE — PROGRESS NOTES
HPI:    Patient ID: Aziza [place] is a 80year old female. Patient presents with:  Hypertension: Pt is f/u with her blood pressure and had brother states that she is having some swelling of her ankles.  Pt did not bring a list of medication and that he beatris capsule Rfl: 0   SEREVENT DISKUS 50 MCG/DOSE Inhalation Aerosol Powder, Breath Activated USE 1 INHALATION TWICE DAILY Disp: 180 each Rfl: 1   ipratropium-albuterol 0.5-2.5 (3) MG/3ML Inhalation Solution USE 1 VIAL VIA NEBULIZER EVERY 4 HOURS AS NEEDED Disp external ear and ear canal normal.   Left Ear: Tympanic membrane, external ear and ear canal normal.   Nose: Nose normal. Right sinus exhibits no maxillary sinus tenderness and no frontal sinus tenderness.  Left sinus exhibits no maxillary sinus tenderness Labs in 4-6 weeks      Acquired hypothyroidism  Stable  cpm     Chronic obstructive pulmonary disease, unspecified copd type (hcc)  Stable  On  o2  3.5 L continuous  continue  Inh  spiriva inhaler, Proventil inhaler 2 puffs as needed    Decreased gfr  Av

## 2018-12-17 ENCOUNTER — APPOINTMENT (OUTPATIENT)
Dept: LAB | Age: 83
End: 2018-12-17
Attending: INTERNAL MEDICINE
Payer: MEDICARE

## 2018-12-17 DIAGNOSIS — R94.4 DECREASED GFR: ICD-10-CM

## 2018-12-17 DIAGNOSIS — I10 ESSENTIAL HYPERTENSION: ICD-10-CM

## 2018-12-17 DIAGNOSIS — E03.9 ACQUIRED HYPOTHYROIDISM: ICD-10-CM

## 2018-12-17 PROCEDURE — 36415 COLL VENOUS BLD VENIPUNCTURE: CPT

## 2018-12-17 PROCEDURE — 84443 ASSAY THYROID STIM HORMONE: CPT

## 2018-12-17 PROCEDURE — 80053 COMPREHEN METABOLIC PANEL: CPT

## 2019-01-17 ENCOUNTER — HOSPITAL ENCOUNTER (EMERGENCY)
Facility: HOSPITAL | Age: 84
Discharge: HOME | End: 2019-01-17
Attending: EMERGENCY MEDICINE
Payer: MEDICARE

## 2019-01-17 ENCOUNTER — APPOINTMENT (OUTPATIENT)
Dept: ULTRASOUND IMAGING | Facility: HOSPITAL | Age: 84
End: 2019-01-17
Attending: EMERGENCY MEDICINE
Payer: MEDICARE

## 2019-01-17 ENCOUNTER — TELEPHONE (OUTPATIENT)
Dept: INTERNAL MEDICINE CLINIC | Facility: CLINIC | Age: 84
End: 2019-01-17

## 2019-01-17 VITALS
RESPIRATION RATE: 16 BRPM | TEMPERATURE: 98 F | OXYGEN SATURATION: 100 % | DIASTOLIC BLOOD PRESSURE: 74 MMHG | HEART RATE: 80 BPM | SYSTOLIC BLOOD PRESSURE: 132 MMHG

## 2019-01-17 DIAGNOSIS — I82.432 DEEP VEIN THROMBOSIS (DVT) OF POPLITEAL VEIN OF LEFT LOWER EXTREMITY, UNSPECIFIED CHRONICITY (HCC): Primary | ICD-10-CM

## 2019-01-17 LAB
ANION GAP SERPL CALC-SCNC: 12 MMOL/L (ref 0–18)
BASOPHILS # BLD: 0 K/UL (ref 0–0.2)
BASOPHILS NFR BLD: 1 %
BUN SERPL-MCNC: 25 MG/DL (ref 8–20)
BUN/CREAT SERPL: 24.8 (ref 10–20)
CALCIUM SERPL-MCNC: 9.5 MG/DL (ref 8.5–10.5)
CHLORIDE SERPL-SCNC: 93 MMOL/L (ref 95–110)
CO2 SERPL-SCNC: 34 MMOL/L (ref 22–32)
CREAT SERPL-MCNC: 1.01 MG/DL (ref 0.5–1.5)
EOSINOPHIL # BLD: 0.1 K/UL (ref 0–0.7)
EOSINOPHIL NFR BLD: 1 %
ERYTHROCYTE [DISTWIDTH] IN BLOOD BY AUTOMATED COUNT: 14.1 % (ref 11–15)
GLUCOSE SERPL-MCNC: 119 MG/DL (ref 70–99)
HCT VFR BLD AUTO: 38.4 % (ref 35–48)
HGB BLD-MCNC: 12.5 G/DL (ref 12–16)
LYMPHOCYTES # BLD: 0.9 K/UL (ref 1–4)
LYMPHOCYTES NFR BLD: 10 %
MCH RBC QN AUTO: 30 PG (ref 27–32)
MCHC RBC AUTO-ENTMCNC: 32.5 G/DL (ref 32–37)
MCV RBC AUTO: 92.3 FL (ref 80–100)
MONOCYTES # BLD: 1 K/UL (ref 0–1)
MONOCYTES NFR BLD: 11 %
NEUTROPHILS # BLD AUTO: 6.9 K/UL (ref 1.8–7.7)
NEUTROPHILS NFR BLD: 78 %
OSMOLALITY UR CALC.SUM OF ELEC: 294 MOSM/KG (ref 275–295)
PLATELET # BLD AUTO: 221 K/UL (ref 140–400)
PMV BLD AUTO: 8.3 FL (ref 7.4–10.3)
POTASSIUM SERPL-SCNC: 4.1 MMOL/L (ref 3.3–5.1)
RBC # BLD AUTO: 4.17 M/UL (ref 3.7–5.4)
SODIUM SERPL-SCNC: 139 MMOL/L (ref 136–144)
WBC # BLD AUTO: 8.9 K/UL (ref 4–11)

## 2019-01-17 PROCEDURE — 99285 EMERGENCY DEPT VISIT HI MDM: CPT

## 2019-01-17 PROCEDURE — 36415 COLL VENOUS BLD VENIPUNCTURE: CPT

## 2019-01-17 PROCEDURE — 93971 EXTREMITY STUDY: CPT | Performed by: EMERGENCY MEDICINE

## 2019-01-17 PROCEDURE — 85025 COMPLETE CBC W/AUTO DIFF WBC: CPT | Performed by: EMERGENCY MEDICINE

## 2019-01-17 PROCEDURE — 80048 BASIC METABOLIC PNL TOTAL CA: CPT | Performed by: EMERGENCY MEDICINE

## 2019-01-17 NOTE — ED INITIAL ASSESSMENT (HPI)
Pt sent here to rule out dvt in the left popliteal vein. Pt said that the pt had a bedside us and was postitive.  Pt is a poor historian and does not answer questions

## 2019-01-17 NOTE — ED NOTES
Pt had LLE US done at home, her brother says it was ordered because she had blistering and fragile skin. Pts brother was called by a radiologist and told she was +for bld clot in L popliteal vein. No report sheet, no report in EMR.  Pt had sensation to bila

## 2019-01-18 NOTE — ED PROVIDER NOTES
Patient Seen in: [facility] Emergency Department    History   Patient presents with:  Deep Vein Thrombosis (cardiovascular)    Stated Complaint: Leg pain ( Blot clot)     HPI  The patient is an 40-year-old female with a history of DVTs in both leg Alcohol/week: 0.6 oz      Types: 1 Standard drinks or equivalent per week      Comment: very rarely    Drug use: No      Review of Systems    Positive for stated complaint: Leg pain ( Blot clot)   Other systems are as noted in HPI.   Constitutional and vi Ratio 24.8 (*)     GFR, Non- 49 (*)     GFR, -American 57 (*)     All other components within normal limits   CBC W/ DIFFERENTIAL - Abnormal; Notable for the following components:    Lymphocyte Absolute 0.9 (*)     All other componen myself and findings discussed with patient including need for follow up              Disposition and Plan     Clinical Impression:  Deep vein thrombosis (DVT) of popliteal vein of left lower extremity, unspecified chronicity ([address]  (primary encounter diagn

## 2019-01-18 NOTE — TELEPHONE ENCOUNTER
History of DVT , on Oxygen at home, lives with brother, in the ER today . Most recently having swelling of feet intermittently , better with elevation. Had an [address] ordered by home doctor. She has a DVT, no mention of old or new.    Dr. Carla Noriega and I discuss

## 2019-01-20 RX ORDER — TRIAMTERENE AND HYDROCHLOROTHIAZIDE 37.5; 25 MG/1; MG/1
CAPSULE ORAL
Qty: 90 CAPSULE | Refills: 0 | Status: SHIPPED | OUTPATIENT
Start: 2019-01-20 | End: 2019-04-19

## 2019-01-20 NOTE — TELEPHONE ENCOUNTER
Refill passed per [facility], [facility] protocol.     Hypertensive Medications  Protocol Criteria:  · Appointment scheduled in the past 6 months or in the next 3 months  · BMP or CMP in the past 12 months  · Creatinine result < 2  Recent Outpatient Visits 01/17/2019   \

## 2019-01-29 RX ORDER — IPRATROPIUM BROMIDE AND ALBUTEROL SULFATE 2.5; .5 MG/3ML; MG/3ML
SOLUTION RESPIRATORY (INHALATION)
Qty: 360 ML | Refills: 0 | Status: SHIPPED | OUTPATIENT
Start: 2019-01-29 | End: 2019-02-16

## 2019-01-31 ENCOUNTER — TELEPHONE (OUTPATIENT)
Dept: PULMONOLOGY | Facility: CLINIC | Age: 84
End: 2019-01-31

## 2019-02-06 ENCOUNTER — OFFICE VISIT (OUTPATIENT)
Dept: INTERNAL MEDICINE CLINIC | Facility: CLINIC | Age: 84
End: 2019-02-06
Payer: MEDICARE

## 2019-02-06 VITALS
RESPIRATION RATE: 20 BRPM | DIASTOLIC BLOOD PRESSURE: 78 MMHG | HEIGHT: 66 IN | HEART RATE: 80 BPM | SYSTOLIC BLOOD PRESSURE: 122 MMHG | TEMPERATURE: 98 F | BODY MASS INDEX: 23.09 KG/M2 | OXYGEN SATURATION: 98 % | WEIGHT: 143.69 LBS

## 2019-02-06 DIAGNOSIS — I82.492 DEEP VEIN THROMBOSIS (DVT) OF OTHER VEIN OF LEFT LOWER EXTREMITY, UNSPECIFIED CHRONICITY (HCC): Primary | ICD-10-CM

## 2019-02-06 DIAGNOSIS — J44.9 CHRONIC OBSTRUCTIVE PULMONARY DISEASE, UNSPECIFIED COPD TYPE (HCC): ICD-10-CM

## 2019-02-06 DIAGNOSIS — I10 ESSENTIAL HYPERTENSION: ICD-10-CM

## 2019-02-06 DIAGNOSIS — I82.4Z2 DEEP VEIN THROMBOSIS (DVT) OF DISTAL VEIN OF LEFT LOWER EXTREMITY, UNSPECIFIED CHRONICITY (HCC): ICD-10-CM

## 2019-02-06 PROCEDURE — G0463 HOSPITAL OUTPT CLINIC VISIT: HCPCS | Performed by: INTERNAL MEDICINE

## 2019-02-06 PROCEDURE — 99214 OFFICE O/P EST MOD 30 MIN: CPT | Performed by: INTERNAL MEDICINE

## 2019-02-07 NOTE — PROGRESS NOTES
HPI:    Patient ID: Dora Schwartz is a 80year old female. Patient presents with:  Deep Vein Thrombosis (cardiovascular): Pt is f/u form the ER with DVT.   Patient  States has some leg swelling  ,but is not worsnening since  Er visit per patient she is Respiratory: Negative for cough, shortness of breath and wheezing. Cardiovascular: Positive for leg swelling (ankle swelling chronic  same like  before ). Negative for chest pain, palpitations, orthopnea and PND.    Gastrointestinal: Negative for abdom Oral Cap Take 1 tablet by mouth daily. Disp:  Rfl:    MAGNESIUM OR Take 160 mg by mouth daily. Disp:  Rfl:    Menaquinone-7 (VITAMIN K2 OR) Take 1,000 mcg by mouth daily. Disp:  Rfl:    Coenzyme Q10 (COQ10) 100 MG Oral Cap Take 100 mg by mouth daily. no rales. Abdominal: Soft. She exhibits no mass. There is no hepatosplenomegaly. There is no tenderness. There is no CVA tenderness. Musculoskeletal: She exhibits edema (bill  ankle  swelling ). Lymphadenopathy:     She has no cervical adenopathy. encounter. Meds This Visit:  Requested Prescriptions     Signed Prescriptions Disp Refills   • Rivaroxaban 20 MG Oral Tab 30 tablet 2     Sig: Take 1 tablet (20 mg total) by mouth daily.  30 min  After  Meal       Imaging & Referrals:  ONCOLOGY/HEMATOL

## 2019-02-11 RX ORDER — TIOTROPIUM BROMIDE 18 UG/1
CAPSULE ORAL; RESPIRATORY (INHALATION)
Qty: 90 CAPSULE | Refills: 0 | Status: SHIPPED | OUTPATIENT
Start: 2019-02-11 | End: 2019-12-23

## 2019-02-16 RX ORDER — IPRATROPIUM BROMIDE AND ALBUTEROL SULFATE 2.5; .5 MG/3ML; MG/3ML
SOLUTION RESPIRATORY (INHALATION)
Qty: 360 ML | Refills: 0 | Status: SHIPPED | OUTPATIENT
Start: 2019-02-16 | End: 2019-09-24

## 2019-02-16 NOTE — TELEPHONE ENCOUNTER
Refill Protocol Appointment Criteria  · Appointment scheduled in the past 6 months or in the next 3 months  Recent Outpatient Visits            1 week ago Deep vein thrombosis (DVT) of other vein of left lower extremity, unspecified chronicity ([facility]    [place]

## 2019-02-20 ENCOUNTER — HOSPITAL ENCOUNTER (OUTPATIENT)
Dept: CT IMAGING | Facility: HOSPITAL | Age: 84
Discharge: HOME | End: 2019-02-20
Attending: INTERNAL MEDICINE
Payer: MEDICARE

## 2019-02-20 DIAGNOSIS — R91.8 PULMONARY NODULES: ICD-10-CM

## 2019-02-20 PROCEDURE — 71250 CT THORAX DX C-: CPT | Performed by: INTERNAL MEDICINE

## 2019-02-27 ENCOUNTER — OFFICE VISIT (OUTPATIENT)
Dept: HEMATOLOGY/ONCOLOGY | Facility: HOSPITAL | Age: 84
End: 2019-02-27
Attending: INTERNAL MEDICINE
Payer: MEDICARE

## 2019-02-27 VITALS
HEIGHT: 66 IN | BODY MASS INDEX: 21.62 KG/M2 | OXYGEN SATURATION: 90 % | DIASTOLIC BLOOD PRESSURE: 127 MMHG | HEART RATE: 99 BPM | RESPIRATION RATE: 18 BRPM | TEMPERATURE: 98 F | SYSTOLIC BLOOD PRESSURE: 156 MMHG | WEIGHT: 134.5 LBS

## 2019-02-27 DIAGNOSIS — I82.402 DEEP VEIN THROMBOSIS (DVT) OF LEFT LOWER EXTREMITY, UNSPECIFIED CHRONICITY, UNSPECIFIED VEIN (HCC): Primary | ICD-10-CM

## 2019-02-27 DIAGNOSIS — R91.1 PULMONARY NODULE: ICD-10-CM

## 2019-02-27 PROCEDURE — 99204 OFFICE O/P NEW MOD 45 MIN: CPT | Performed by: INTERNAL MEDICINE

## 2019-02-27 NOTE — CONSULTS
[facility]    PATIENT'S NAME: Paty [place]   CONSULTING PHYSICIAN: Frederic Almanza.  Alexandria Osei MD   PATIENT ACCOUNT #: [de-identified] LOCATION: [address] [place] [address] RECORD #: [id number] YOB: 1929   CONSULTATION DATE: 02/27/2019       CANCER [facility] any current alcohol, tobacco, or illicit drug use. FAMILY MEDICAL HISTORY:  No history of any venous thromboembolic disease in the family. ALLERGIES:  Penicillins. REVIEW OF SYSTEMS:  Other review of systems negative x12.       PHYSICAL EXAMINATIO function. Depending upon her course, we may be able to consider taking her down to a prophylactic dose of 10 mg at a later date; however, now we will continue 20 mg and have her return to clinic in 4 to 5 months.     For the patient's pulmonary nodules/mas

## 2019-04-02 ENCOUNTER — OFFICE VISIT (OUTPATIENT)
Dept: HEMATOLOGY/ONCOLOGY | Facility: HOSPITAL | Age: 84
End: 2019-04-02
Attending: INTERNAL MEDICINE
Payer: MEDICARE

## 2019-04-02 VITALS
BODY MASS INDEX: 21.11 KG/M2 | RESPIRATION RATE: 18 BRPM | DIASTOLIC BLOOD PRESSURE: 62 MMHG | SYSTOLIC BLOOD PRESSURE: 137 MMHG | HEIGHT: 66 IN | HEART RATE: 103 BPM | TEMPERATURE: 98 F | WEIGHT: 131.38 LBS | OXYGEN SATURATION: 88 %

## 2019-04-02 DIAGNOSIS — F03.90 DEMENTIA WITHOUT BEHAVIORAL DISTURBANCE, UNSPECIFIED DEMENTIA TYPE (HCC): ICD-10-CM

## 2019-04-02 DIAGNOSIS — D64.9 ANEMIA, UNSPECIFIED TYPE: ICD-10-CM

## 2019-04-02 DIAGNOSIS — I82.402 DEEP VEIN THROMBOSIS (DVT) OF LEFT LOWER EXTREMITY, UNSPECIFIED CHRONICITY, UNSPECIFIED VEIN (HCC): Primary | ICD-10-CM

## 2019-04-02 PROCEDURE — 99214 OFFICE O/P EST MOD 30 MIN: CPT | Performed by: INTERNAL MEDICINE

## 2019-04-02 NOTE — PROGRESS NOTES
Cancer Center Progress Note    Patient Name: Will Serna   YOB: 1929   Medical Record Number: [id number]   Attending Physician: Lake Crowley M.D.      Chief Complaint:  Recurrent DVT    History of Present Illness:  14-year-old female with h per NG   • History of measles, mumps, or rubella     Measles and Mumps; per NG   • History of pneumonia     per NG   • History of pneumothorax 2011    per NG   • Hypothyroidism     per NG   • Multiple fractures 10/29/2017   • Obesity, unspecified     p Relationship status: Not on file      Intimate partner violence:        Fear of current or ex partner: Not on file        Emotionally abused: Not on file        Physically abused: Not on file        Forced sexual activity: Not on file    Other Topics Albuterol Sulfate HFA (VENTOLIN HFA) 108 (90 Base) MCG/ACT Inhalation Aero Soln, Inhale 2 puffs into the lungs every 4 (four) hours as needed for Wheezing or Shortness of Breath., Disp: 1 Inhaler, Rfl: 0  •  PEG 3350 Oral Powd Pack, Take 17 g by mouth 2 (t PERRL. Oropharynx is clear. Neck: No JVD. No palpable lymphadenopathy. Neck is supple. Lymphatics:  There is no palpable peripheral lymphadenopathy   Chest: Symmetric expansion, nonlabored breathing  Cardiovascular: Regular with palpable distal pulses based on her renal function. Depending upon her course, we may be able to consider taking her down to a prophylactic dose of 10 mg at a later date; however, now we will continue 20 mg and have her return to clinic in 4 to 5 months.   --She has lower extrem

## 2019-04-08 ENCOUNTER — OFFICE VISIT (OUTPATIENT)
Dept: INTERNAL MEDICINE CLINIC | Facility: CLINIC | Age: 84
End: 2019-04-08
Payer: MEDICARE

## 2019-04-08 VITALS
WEIGHT: 129.81 LBS | HEIGHT: 66 IN | HEART RATE: 91 BPM | DIASTOLIC BLOOD PRESSURE: 77 MMHG | SYSTOLIC BLOOD PRESSURE: 122 MMHG | RESPIRATION RATE: 20 BRPM | TEMPERATURE: 98 F | BODY MASS INDEX: 20.86 KG/M2

## 2019-04-08 DIAGNOSIS — L98.9 LEG SKIN LESION, LEFT: ICD-10-CM

## 2019-04-08 DIAGNOSIS — I82.403 DEEP VEIN THROMBOSIS (DVT) OF BOTH LOWER EXTREMITIES, UNSPECIFIED CHRONICITY, UNSPECIFIED VEIN (HCC): ICD-10-CM

## 2019-04-08 DIAGNOSIS — J44.9 CHRONIC OBSTRUCTIVE PULMONARY DISEASE, UNSPECIFIED COPD TYPE (HCC): Primary | ICD-10-CM

## 2019-04-08 DIAGNOSIS — L98.9 LEG SKIN LESION, RIGHT: ICD-10-CM

## 2019-04-08 PROCEDURE — G0463 HOSPITAL OUTPT CLINIC VISIT: HCPCS | Performed by: INTERNAL MEDICINE

## 2019-04-08 PROCEDURE — 99214 OFFICE O/P EST MOD 30 MIN: CPT | Performed by: INTERNAL MEDICINE

## 2019-04-08 RX ORDER — EFINACONAZOLE 100 MG/ML
SOLUTION TOPICAL
COMMUNITY
Start: 2019-04-05

## 2019-04-08 NOTE — PROGRESS NOTES
HPI:    Patient ID: Eb Cevallos is a 80year old female. Patient in office today for BP  follow up visit. States feeling well otherwise.   Patient and her brother states she is taking her blood thinner for the blood clots and denies any problems taking t TRIAMTERENE-HCTZ 37.5-25 MG Oral Cap TAKE 1 CAPSULE DAILY Disp: 90 capsule Rfl: 0   LEVOTHYROXINE SODIUM 75 MCG Oral Tab TAKE 1 TABLET DAILY Disp: 90 tablet Rfl: 0   SEREVENT DISKUS 50 MCG/DOSE Inhalation Aerosol Powder, Breath Activated USE 1 INHALATION T Head: Normocephalic and atraumatic. Right Ear: Tympanic membrane and ear canal normal.   Left Ear: Tympanic membrane and ear canal normal.   Nose: Right sinus exhibits no maxillary sinus tenderness and no frontal sinus tenderness.  Left sinus exhibits no Home health wound nurse coming to home  Dressing applied its healing well continue present medication  Patient might need to be checked with a dermatologist as well  - DERM - INTERNAL    4.  Deep vein thrombosis (DVT) of both lower extremities, unspecified

## 2019-04-16 NOTE — PROGRESS NOTES
Neurology follow-up visit     Referred By: Dr. Harrison ref.  provider found    Chief Complaint: Patient presents with:  Memory Loss: Pt c/o memory issues, f/u last visit 10/16.18, she forgets, hearing problems, when she wakes up does not remembers, doesnt fini pulmonary disease) ([facility]    • Fracture, cervical vertebra ([facility] 9/2014    surgically repaired   • Hearing impairment    • High blood pressure    • History of chickenpox     per NG   • History of measles, mumps, or rubella     Measles and Mumps; per    • daily.  , Disp: , Rfl:   •  Cobalamine Combinations (VITAMIN B12-FOLIC ACID OR), Take 1 tablet by mouth daily. , Disp: , Rfl:   •  Resveratrol 100 MG Oral Cap, Take 100 mg by mouth daily.   , Disp: , Rfl:   •  PATIENT SUPPLIED MEDICATION, Smoothie included facial weakness  VIII. Hearing intact to whisper, tuning fork or finger rub. IX. Pallet elevates symmetrically. XI. Shoulder shrug is intact  XII.  Tongue is midline    Motor Exam:  Muscle tone normal  No atrophy or fasciculations  Rapid alternating movem

## 2019-04-20 RX ORDER — TRIAMTERENE AND HYDROCHLOROTHIAZIDE 37.5; 25 MG/1; MG/1
CAPSULE ORAL
Qty: 90 CAPSULE | Refills: 1 | Status: SHIPPED | OUTPATIENT
Start: 2019-04-20 | End: 2019-10-16

## 2019-04-25 RX ORDER — TIOTROPIUM BROMIDE 18 UG/1
CAPSULE ORAL; RESPIRATORY (INHALATION)
Qty: 90 CAPSULE | Refills: 0 | OUTPATIENT
Start: 2019-04-25

## 2019-05-24 RX ORDER — LEVOTHYROXINE SODIUM 0.07 MG/1
TABLET ORAL
Qty: 90 TABLET | Refills: 1 | Status: SHIPPED | OUTPATIENT
Start: 2019-05-24 | End: 2019-11-02

## 2019-05-24 NOTE — TELEPHONE ENCOUNTER
Refill passed per [facility], [facility] protocol.   Hypothyroid Medications  Protocol Criteria:  Appointment scheduled in the past 12 months or the next 3 months  TSH resulted in the past 12 months that is normal  Recent Outpatient Visits            1 month ago L

## 2019-05-26 ENCOUNTER — HOSPITAL ENCOUNTER (OUTPATIENT)
Dept: CT IMAGING | Facility: HOSPITAL | Age: 84
Discharge: HOME | End: 2019-05-26
Payer: MEDICARE

## 2019-05-26 DIAGNOSIS — S09.90XA HEAD INJURY: ICD-10-CM

## 2019-05-26 PROCEDURE — 70450 CT HEAD/BRAIN W/O DYE: CPT

## 2019-05-26 PROCEDURE — 70450 CT HEAD/BRAIN W/O DYE: CPT | Performed by: INTERNAL MEDICINE

## 2019-06-26 ENCOUNTER — OFFICE VISIT (OUTPATIENT)
Dept: HEMATOLOGY/ONCOLOGY | Facility: HOSPITAL | Age: 84
End: 2019-06-26
Attending: INTERNAL MEDICINE
Payer: MEDICARE

## 2019-06-26 VITALS
DIASTOLIC BLOOD PRESSURE: 57 MMHG | SYSTOLIC BLOOD PRESSURE: 112 MMHG | OXYGEN SATURATION: 80 % | RESPIRATION RATE: 16 BRPM | TEMPERATURE: 99 F | HEART RATE: 101 BPM

## 2019-06-26 DIAGNOSIS — D64.9 ANEMIA, UNSPECIFIED TYPE: ICD-10-CM

## 2019-06-26 DIAGNOSIS — I82.402 DEEP VEIN THROMBOSIS (DVT) OF LEFT LOWER EXTREMITY, UNSPECIFIED CHRONICITY, UNSPECIFIED VEIN (HCC): Primary | ICD-10-CM

## 2019-06-26 DIAGNOSIS — F03.90 DEMENTIA WITHOUT BEHAVIORAL DISTURBANCE, UNSPECIFIED DEMENTIA TYPE (HCC): ICD-10-CM

## 2019-06-26 PROCEDURE — 99214 OFFICE O/P EST MOD 30 MIN: CPT | Performed by: INTERNAL MEDICINE

## 2019-06-26 NOTE — PROGRESS NOTES
Cancer Center Progress Note    Patient Name: Zahra Rajan   YOB: 1929   Medical Record Number: [id number]   Attending Physician: Daisy Joya M.D.      Chief Complaint:  Recurrent DVT    History of Present Illness:  51-year-old female with h rubella     Measles and Mumps; per NG   • History of pneumonia     per NG   • History of pneumothorax 2011    per NG   • Hypothyroidism     per NG   • Multiple fractures 10/29/2017   • Obesity, unspecified     per NG   • Pneumonia due to organism    • Skin partner violence:        Fear of current or ex partner: Not on file        Emotionally abused: Not on file        Physically abused: Not on file        Forced sexual activity: Not on file    Other Topics      Concerns:         Service: Not Asked USE 1 INHALATION TWICE DAILY, Disp: 180 each, Rfl: 1  •  Albuterol Sulfate HFA (VENTOLIN HFA) 108 (90 Base) MCG/ACT Inhalation Aero Soln, Inhale 2 puffs into the lungs every 4 (four) hours as needed for Wheezing or Shortness of Breath., Disp: 1 Inhaler, Rf intact. PERRL. Oropharynx is clear. Neck: No JVD. No palpable lymphadenopathy. Neck is supple. Lymphatics:  There is no palpable peripheral lymphadenopathy   Chest: Symmetric expansion, nonlabored breathing  Cardiovascular: Regular with palpable distal p this indefinitely--She has lower extremity skin changes of chronic venous stasis     --For the patient's pulmonary nodules/masslike lesion, she is following up with Pulmonology and as repeat imaging planned. --Anemia.   Adequate iron sat and hgb currentl

## 2019-07-08 ENCOUNTER — OFFICE VISIT (OUTPATIENT)
Dept: INTERNAL MEDICINE CLINIC | Facility: CLINIC | Age: 84
End: 2019-07-08
Payer: MEDICARE

## 2019-07-08 ENCOUNTER — TELEPHONE (OUTPATIENT)
Dept: DERMATOLOGY CLINIC | Facility: CLINIC | Age: 84
End: 2019-07-08

## 2019-07-08 VITALS
TEMPERATURE: 98 F | BODY MASS INDEX: 19.25 KG/M2 | SYSTOLIC BLOOD PRESSURE: 109 MMHG | WEIGHT: 119.81 LBS | HEIGHT: 66 IN | RESPIRATION RATE: 20 BRPM | DIASTOLIC BLOOD PRESSURE: 69 MMHG | HEART RATE: 98 BPM

## 2019-07-08 DIAGNOSIS — E03.8 HYPOTHYROIDISM DUE TO HASHIMOTO'S THYROIDITIS: ICD-10-CM

## 2019-07-08 DIAGNOSIS — E06.3 HYPOTHYROIDISM DUE TO HASHIMOTO'S THYROIDITIS: ICD-10-CM

## 2019-07-08 DIAGNOSIS — I82.403 DEEP VEIN THROMBOSIS (DVT) OF BOTH LOWER EXTREMITIES, UNSPECIFIED CHRONICITY, UNSPECIFIED VEIN (HCC): ICD-10-CM

## 2019-07-08 DIAGNOSIS — I10 ESSENTIAL HYPERTENSION: Primary | ICD-10-CM

## 2019-07-08 DIAGNOSIS — L98.9 SKIN LESION OF LOWER EXTREMITY: ICD-10-CM

## 2019-07-08 DIAGNOSIS — J44.9 CHRONIC OBSTRUCTIVE PULMONARY DISEASE, UNSPECIFIED COPD TYPE (HCC): ICD-10-CM

## 2019-07-08 PROCEDURE — G0463 HOSPITAL OUTPT CLINIC VISIT: HCPCS | Performed by: INTERNAL MEDICINE

## 2019-07-08 PROCEDURE — 99214 OFFICE O/P EST MOD 30 MIN: CPT | Performed by: INTERNAL MEDICINE

## 2019-07-08 NOTE — PROGRESS NOTES
HPI:    Patient ID: Prosper Shultz is a 80year old female. No chief complaint on file. Patient in office today for  htn follow up visit.  Per her  brother  And pt is  feeling well  Denies chest pain, unusual shortnesss of breath - on  3 L O2 at home  For NEBULIZER EVERY 4 HOURS AS NEEDED Disp: 360 mL Rfl: 0   SPIRIVA HANDIHALER 18 MCG Inhalation Cap INHALE THE CONTENTS OF 1 CAPSULE DAILY Disp: 90 capsule Rfl: 0   SEREVENT DISKUS 50 MCG/DOSE Inhalation Aerosol Powder, Breath Activated USE 1 INHALATION TWICE membrane, external ear and ear canal normal.   Left Ear: Tympanic membrane, external ear and ear canal normal.   Nose: Nose normal. Right sinus exhibits no maxillary sinus tenderness and no frontal sinus tenderness.  Left sinus exhibits no maxillary sinus t discussed with patient   Patient verbalizes understanding and compliance  Losartan    50  Mg   Will   . Decrease  Triam  hctz    -     Due to lower blood pressure today and some signs of dehydration on exam  Patient will cut down triamterene hydrochlorothia

## 2019-07-08 NOTE — TELEPHONE ENCOUNTER
LOV 1/20/17. S/w pt's spouse (POA) pt with \"blood blisters\" on/off for 6-8 months. Pt also has a growth (about an inch) to left leg. Denies pain/discomfort. Spouse states blisters break and bleed periodically. Denies swelling. Denies fever/chills.  Pt see

## 2019-07-08 NOTE — TELEPHONE ENCOUNTER
Can 'add on' 10:45 ( end of surgery on 7/25 ) to evaluate lesion on leg only- per Dr. Rodolfo Mcfarland note there are no\"blood blisters\"- pt has bruising from her blood thinners

## 2019-07-25 ENCOUNTER — OFFICE VISIT (OUTPATIENT)
Dept: DERMATOLOGY CLINIC | Facility: CLINIC | Age: 84
End: 2019-07-25
Payer: MEDICARE

## 2019-07-25 DIAGNOSIS — L72.0 EPIDERMAL CYST: Primary | ICD-10-CM

## 2019-07-25 DIAGNOSIS — L89.301 PRESSURE INJURY OF BUTTOCK, STAGE 1, UNSPECIFIED LATERALITY: ICD-10-CM

## 2019-07-25 DIAGNOSIS — T14.8XXA ABRASION: ICD-10-CM

## 2019-07-25 PROCEDURE — 99213 OFFICE O/P EST LOW 20 MIN: CPT | Performed by: DERMATOLOGY

## 2019-07-25 PROCEDURE — G0463 HOSPITAL OUTPT CLINIC VISIT: HCPCS | Performed by: DERMATOLOGY

## 2019-07-25 PROCEDURE — 10060 I&D ABSCESS SIMPLE/SINGLE: CPT | Performed by: DERMATOLOGY

## 2019-07-25 NOTE — PROGRESS NOTES
HPI:     Chief Complaint     Lesion        HPI     Lesion      Additional comments: LOV 11/20/2017. Pt presenting with grow-like lesion to L lower leg and blood blisters to biltaeral legs. Pt has a personal hx of AKs.           Last edited by Bubba Patel INHALATION TWICE DAILY Disp: 180 each Rfl: 1   Albuterol Sulfate HFA (VENTOLIN HFA) 108 (90 Base) MCG/ACT Inhalation Aero Soln Inhale 2 puffs into the lungs every 4 (four) hours as needed for Wheezing or Shortness of Breath.  Disp: 1 Inhaler Rfl: 0   [place] Pneumonia due to organism    • Skin disorder     \"Chronic skin problems\"; per NG   • Unspecified essential hypertension     per NG     Past Surgical History:   Procedure Laterality Date   • SPINE SURGERY PROCEDURE UNLISTED       Social History    Socioec Occupational Exposure: Not Asked        Hobby Hazards: Not Asked        Sleep Concern: Not Asked        Stress Concern: Not Asked        Weight Concern: Not Asked        Special Diet: Not Asked        Back Care: Not Asked        Exercise: No        Bike apply antibiotic ointment and Telfa and zinc oxide. If worsens should go to the wound clinic.     Orders Placed This Encounter      DRAIN SKIN ABSCESS SIMPLE      Results From Past 48 Hours:  No results found for this or any previous visit (from the past 4

## 2019-07-25 NOTE — PROCEDURES
Incision and drainage      The affected area was cleansed with alcohol. An incision was created with a #11 blade. 3 cc of cystic material was expressed. vaseline and a pressure dressing was applied. The patient tolerated the procedure well.   Post op i

## 2019-08-15 ENCOUNTER — OFFICE VISIT (OUTPATIENT)
Dept: PULMONOLOGY | Facility: CLINIC | Age: 84
End: 2019-08-15
Payer: MEDICARE

## 2019-08-15 VITALS
OXYGEN SATURATION: 95 % | HEART RATE: 85 BPM | BODY MASS INDEX: 19.77 KG/M2 | WEIGHT: 123 LBS | DIASTOLIC BLOOD PRESSURE: 74 MMHG | SYSTOLIC BLOOD PRESSURE: 125 MMHG | HEIGHT: 66 IN

## 2019-08-15 DIAGNOSIS — R91.8 PULMONARY NODULES: Primary | ICD-10-CM

## 2019-08-15 DIAGNOSIS — Z66 DNR (DO NOT RESUSCITATE): ICD-10-CM

## 2019-08-15 PROCEDURE — 99213 OFFICE O/P EST LOW 20 MIN: CPT | Performed by: INTERNAL MEDICINE

## 2019-08-15 PROCEDURE — G0463 HOSPITAL OUTPT CLINIC VISIT: HCPCS | Performed by: INTERNAL MEDICINE

## 2019-08-15 NOTE — PROGRESS NOTES
The patient is an 28-year-old female who I know well from prior evaluation and comes in now for follow-up. She is eating poorly part because of dental processes. She had a CT scan chest in February which suggested stability of the lung lesion.   She was t

## 2019-09-26 RX ORDER — IPRATROPIUM BROMIDE AND ALBUTEROL SULFATE 2.5; .5 MG/3ML; MG/3ML
SOLUTION RESPIRATORY (INHALATION)
Qty: 360 ML | Refills: 0 | Status: SHIPPED | OUTPATIENT
Start: 2019-09-26 | End: 2019-10-13

## 2019-09-26 NOTE — TELEPHONE ENCOUNTER
Review pended refill request as it does not fall under a protocol.     Requested Prescriptions     Pending Prescriptions Disp Refills   • IPRATROPIUM-ALBUTEROL 0.5-2.5 (3) MG/3ML Inhalation Solution [Pharmacy Med Name: Ipratropium/Albuterol 0.5-2.5 (3) Mg/3

## 2019-10-13 RX ORDER — IPRATROPIUM BROMIDE AND ALBUTEROL SULFATE 2.5; .5 MG/3ML; MG/3ML
SOLUTION RESPIRATORY (INHALATION)
Qty: 360 ML | Refills: 1 | Status: SHIPPED | OUTPATIENT
Start: 2019-10-13 | End: 2019-11-26

## 2019-10-13 NOTE — TELEPHONE ENCOUNTER
Refill passed per [facility], [facility] protocol.   Refill Protocol Appointment Criteria  · Appointment scheduled in the past 6 months or in the next 3 months  Recent Outpatient Visits            1 month ago Pulmonary nodules    [facility]

## 2019-10-17 RX ORDER — TRIAMTERENE AND HYDROCHLOROTHIAZIDE 37.5; 25 MG/1; MG/1
CAPSULE ORAL
Qty: 90 CAPSULE | Refills: 1 | Status: SHIPPED | OUTPATIENT
Start: 2019-10-17 | End: 2020-04-14

## 2019-10-17 NOTE — TELEPHONE ENCOUNTER
Patient passes protocol, but please review warning below and advise. Script pended.     Drug-Drug: Triamterene-HCTZ and losartan Potassium   The risk of hyperkalemia may be increased when potassium-sparing diuretics are co-administered with angiotensin II r

## 2019-10-28 ENCOUNTER — APPOINTMENT (OUTPATIENT)
Dept: LAB | Age: 84
End: 2019-10-28
Attending: INTERNAL MEDICINE
Payer: MEDICARE

## 2019-10-28 ENCOUNTER — TELEPHONE (OUTPATIENT)
Dept: INTERNAL MEDICINE CLINIC | Facility: CLINIC | Age: 84
End: 2019-10-28

## 2019-10-28 DIAGNOSIS — I10 ESSENTIAL HYPERTENSION: ICD-10-CM

## 2019-10-28 DIAGNOSIS — E03.8 HYPOTHYROIDISM DUE TO HASHIMOTO'S THYROIDITIS: ICD-10-CM

## 2019-10-28 DIAGNOSIS — E06.3 HYPOTHYROIDISM DUE TO HASHIMOTO'S THYROIDITIS: ICD-10-CM

## 2019-10-28 PROCEDURE — 84443 ASSAY THYROID STIM HORMONE: CPT

## 2019-10-28 PROCEDURE — 36415 COLL VENOUS BLD VENIPUNCTURE: CPT

## 2019-10-28 PROCEDURE — 80053 COMPREHEN METABOLIC PANEL: CPT

## 2019-10-28 NOTE — TELEPHONE ENCOUNTER
Discussed with patient and brother , is doing her usual state  , denies any complains   patient is on oxygen using about 3 L of oxygen with good oxygenation  - 90- 95%    per patient brother the   [facility] nurse usually comes once a week and check on her  Vitals

## 2019-10-28 NOTE — TELEPHONE ENCOUNTER
Acute (STAT labs--critical CO2)      Jonh Herring MD  You 28 minutes ago (3:42 PM)        Noted  Will call and    Discuss  With pt

## 2019-10-28 NOTE — TELEPHONE ENCOUNTER
Please see critical results below and advise       Contact Occurred Topic    Racquel Hennessy -[phone] 10/28/2019 03:07 PM by Luciana Corona    The following critical result(s) were read back and acknowledged.    CO2: 40.0 mmol/L High C

## 2019-10-28 NOTE — TELEPHONE ENCOUNTER
Acute (STAT labs--critical CO2)      Natasha Stewart MD  You 53 minutes ago (5:25 PM)        Discussed with patient and brother , is doing her usual state  , denies any complains   patient is on oxygen using about 3 L of oxygen with good oxygenation  -

## 2019-11-02 RX ORDER — LEVOTHYROXINE SODIUM 0.07 MG/1
TABLET ORAL
Qty: 90 TABLET | Refills: 1 | Status: SHIPPED | OUTPATIENT
Start: 2019-11-02 | End: 2020-05-01

## 2019-11-02 NOTE — TELEPHONE ENCOUNTER
Refill passed per [facility] protocol.     Hypothyroid Medications  Protocol Criteria:  Appointment scheduled in the past 12 months or the next 3 months  TSH resulted in the past 12 months that is normal  Recent Outpatient Visits            2 months ago Pulmonary nodules    [place], Cinthya CHAPA, Edi Gonzalez MD    Office Visit    3 months ago Epidermal cyst    [facility] Giancarlo Dotson MD    Office Visit    3 months ago Essential hypertension    [facility], [address], [place], Seltjarnarnes, MD    Office Visit    4 months ago Deep vein thrombosis (DVT) of left lower extremity, unspecified chronicity, unspecified vein [facility])    [facility] Hematology Oncology Mimi Hahn MD    Office Visit    6 months ago Late onset Alzheimer's disease without behavioral disturbance    [facility] Richard Urias MD    Office Visit        Future Appointments       Provider Department Appt Notes    In 2 days Marah Draper MD [facility], [facility] 3 m     In 3 months Edi Zhu MD [place], [address] 6 months    In 7 months Karen Medel, [facility] Hematology Oncology 1 year follow up        Lab Results   Component Value Date    TSH 3.020 10/28/2019

## 2019-11-04 ENCOUNTER — OFFICE VISIT (OUTPATIENT)
Dept: INTERNAL MEDICINE CLINIC | Facility: CLINIC | Age: 84
End: 2019-11-04
Payer: MEDICARE

## 2019-11-04 VITALS
OXYGEN SATURATION: 89 % | WEIGHT: 128 LBS | RESPIRATION RATE: 20 BRPM | SYSTOLIC BLOOD PRESSURE: 135 MMHG | HEIGHT: 66 IN | HEART RATE: 94 BPM | BODY MASS INDEX: 20.57 KG/M2 | TEMPERATURE: 98 F | DIASTOLIC BLOOD PRESSURE: 73 MMHG

## 2019-11-04 DIAGNOSIS — E03.9 ACQUIRED HYPOTHYROIDISM: ICD-10-CM

## 2019-11-04 DIAGNOSIS — Z23 INFLUENZA VACCINATION GIVEN: Primary | ICD-10-CM

## 2019-11-04 DIAGNOSIS — I10 ESSENTIAL HYPERTENSION: ICD-10-CM

## 2019-11-04 DIAGNOSIS — J44.9 CHRONIC OBSTRUCTIVE PULMONARY DISEASE, UNSPECIFIED COPD TYPE (HCC): ICD-10-CM

## 2019-11-04 PROCEDURE — 99214 OFFICE O/P EST MOD 30 MIN: CPT | Performed by: INTERNAL MEDICINE

## 2019-11-04 PROCEDURE — G0463 HOSPITAL OUTPT CLINIC VISIT: HCPCS | Performed by: INTERNAL MEDICINE

## 2019-11-04 PROCEDURE — G0008 ADMIN INFLUENZA VIRUS VAC: HCPCS | Performed by: INTERNAL MEDICINE

## 2019-11-04 PROCEDURE — 90662 IIV NO PRSV INCREASED AG IM: CPT | Performed by: INTERNAL MEDICINE

## 2019-11-04 NOTE — PROGRESS NOTES
HPI:    Patient ID: Shaq Valdez is a 80year old female. Patient presents with:  Hypertension: Pt is f/u with her blood presure  Patient in office today for  htn follow up visit.  Per her  brother  and pt is  feeling well  Denies any unusual symptoms   L • IPRATROPIUM-ALBUTEROL 0.5-2.5 (3) MG/3ML Inhalation Solution USE 1 VIAL (3 ML ) VIA NEBULIZER EVERY 4 HOURS AS NEEDED 360 mL 1   • rivaroxaban 10 MG Oral Tab Take 1 tablet (10 mg total) by mouth daily with food.  90 tablet 3   • JUBLIA 10 % External Solut Right Ear: Tympanic membrane and ear canal normal.   Left Ear: Tympanic membrane and ear canal normal.   Nose: Nose normal. Right sinus exhibits no maxillary sinus tenderness and no frontal sinus tenderness.  Left sinus exhibits no maxillary sinus tendernes The side effects of medication discussed with patient   Patient verbalizes understanding and compliance  Losartan    50  Mg  Qd ,   Triam / hctz   -Pt taking 3  Times  Per  Week   Stable  CPM   Labs discussed with pt   - CO2   Is  40   -   Pt  Asymptomatic Meds This Visit:  Requested Prescriptions      No prescriptions requested or ordered in this encounter       Imaging & Referrals:  FLU VACC HIGH DOSE PRSV FREE       EF#[id number]

## 2019-11-26 RX ORDER — IPRATROPIUM BROMIDE AND ALBUTEROL SULFATE 2.5; .5 MG/3ML; MG/3ML
SOLUTION RESPIRATORY (INHALATION)
Qty: 360 ML | Refills: 5 | Status: SHIPPED | OUTPATIENT
Start: 2019-11-26

## 2019-11-26 NOTE — TELEPHONE ENCOUNTER
Refill passed per [facility], [facility] protocol.   Refill Protocol Appointment Criteria  · Appointment scheduled in the past 6 months or in the next 3 months  Recent Outpatient Visits            3 weeks ago Influenza vaccination given    [facility],  [address]

## 2019-12-23 NOTE — TELEPHONE ENCOUNTER
Brother requesting refill: Please sent script to mail order pharmacy [facility]           Current Outpatient Medications:     •  SPIRIVA HANDIHALER 18 MCG Inhalation Cap, INHALE THE CONTENTS OF 1 CAPSULE DAILY, Disp: 90 capsule, Rfl: 0

## 2019-12-23 NOTE — TELEPHONE ENCOUNTER
LOV 8/15/19  Last refill 2/11/19, previously filled by PCP, routed for [facility] for sign off.

## 2019-12-26 ENCOUNTER — TELEPHONE (OUTPATIENT)
Dept: ENDOCRINOLOGY CLINIC | Facility: CLINIC | Age: 84
End: 2019-12-26

## 2019-12-26 NOTE — TELEPHONE ENCOUNTER
Current Outpatient Medications:   •  Tiotropium Bromide Monohydrate (SPIRIVA HANDIHALER) 18 MCG Inhalation Cap, INHALE THE CONTENTS OF 1 CAPSULE DAILY, Disp: 90 capsule, Rfl: 0

## 2019-12-27 ENCOUNTER — TELEPHONE (OUTPATIENT)
Dept: PULMONOLOGY | Facility: CLINIC | Age: 84
End: 2019-12-27

## 2020-01-03 NOTE — TELEPHONE ENCOUNTER
[facility] states Tiotropium Bromide Monohydrate is not covered by patient's insurance and requesting an alt. Please call with ref# [id number] - aware Dr Lakisha Nicholas is not in office today. Thank you.

## 2020-01-03 NOTE — TELEPHONE ENCOUNTER
Spoke to Winston Sparrow at [facility] she states Spiriva is not covered and preferred medication is Incruse Ellipta.

## 2020-01-06 NOTE — TELEPHONE ENCOUNTER
Brother Pascual Thomas (NIKOLAS on file) informed of Dr. Tyler Richardson message/orders below regarding preferred medication and pharmacy message below. Pascual Thomas verbalized understanding.

## 2020-01-31 NOTE — TELEPHONE ENCOUNTER
Current Outpatient Medications   Medication Sig Dispense Refill   • Umeclidinium Bromide (INCRUSE ELLIPTA) 62.5 MCG/INH Inhalation Aerosol Powder, Breath Activated Inhale 1 puff into the lungs every morning.  1 each 5     90 days

## 2020-02-03 ENCOUNTER — HOSPITAL ENCOUNTER (OUTPATIENT)
Dept: CT IMAGING | Facility: HOSPITAL | Age: 85
Discharge: HOME | End: 2020-02-03
Attending: INTERNAL MEDICINE
Payer: MEDICARE

## 2020-02-03 DIAGNOSIS — R91.8 PULMONARY NODULES: ICD-10-CM

## 2020-02-03 PROCEDURE — 71250 CT THORAX DX C-: CPT | Performed by: INTERNAL MEDICINE

## 2020-03-12 ENCOUNTER — OFFICE VISIT (OUTPATIENT)
Dept: INTERNAL MEDICINE CLINIC | Facility: CLINIC | Age: 85
End: 2020-03-12
Payer: MEDICARE

## 2020-03-12 VITALS
OXYGEN SATURATION: 93 % | BODY MASS INDEX: 21 KG/M2 | DIASTOLIC BLOOD PRESSURE: 80 MMHG | HEIGHT: 66 IN | HEART RATE: 91 BPM | SYSTOLIC BLOOD PRESSURE: 123 MMHG

## 2020-03-12 DIAGNOSIS — Z23 NEED FOR DIPHTHERIA-TETANUS-PERTUSSIS (TDAP) VACCINE: ICD-10-CM

## 2020-03-12 DIAGNOSIS — E03.9 ACQUIRED HYPOTHYROIDISM: ICD-10-CM

## 2020-03-12 DIAGNOSIS — I10 ESSENTIAL HYPERTENSION: Primary | ICD-10-CM

## 2020-03-12 DIAGNOSIS — Z23 NEED FOR VACCINATION: ICD-10-CM

## 2020-03-12 PROCEDURE — G0463 HOSPITAL OUTPT CLINIC VISIT: HCPCS | Performed by: INTERNAL MEDICINE

## 2020-03-12 PROCEDURE — 90715 TDAP VACCINE 7 YRS/> IM: CPT | Performed by: INTERNAL MEDICINE

## 2020-03-12 PROCEDURE — 99214 OFFICE O/P EST MOD 30 MIN: CPT | Performed by: INTERNAL MEDICINE

## 2020-03-12 PROCEDURE — 90471 IMMUNIZATION ADMIN: CPT | Performed by: INTERNAL MEDICINE

## 2020-03-12 NOTE — PROGRESS NOTES
HPI:    Patient ID: Prosper Shultz is a 80year old female.   Patient presents with:  HTN: States f/u visit    Patient in office today for well here is for regular checkup blood pressures checkup ,  Per  patient  Brother ,stated while was coming to the offic • Tiotropium Bromide Monohydrate (SPIRIVA HANDIHALER) 18 MCG Inhalation Cap INHALE THE CONTENTS OF 1 CAPSULE DAILY 90 capsule 0   • IPRATROPIUM-ALBUTEROL 0.5-2.5 (3) MG/3ML Inhalation Solution USE 1 VIAL (3 ML ) VIA NEBULIZER EVERY 4 HOURS AS NEEDED 360 mL Constitutional: She is oriented to person, place, and time. She appears well-developed and well-nourished. No distress. HENT:   Head: Normocephalic and atraumatic.    Right Ear: Tympanic membrane and ear canal normal.   Left Ear: Tympanic membrane and ear Maintain ideal weight/BMI   Regular walking/exercise- as tolerated   Track and record blood pressure and heart rate at home   The side effects of medication discussed with patient   Patient verbalizes understanding and compliance  Losartan    50  Mg  Qd ,

## 2020-04-14 RX ORDER — TRIAMTERENE AND HYDROCHLOROTHIAZIDE 37.5; 25 MG/1; MG/1
CAPSULE ORAL
Qty: 90 CAPSULE | Refills: 3 | Status: SHIPPED | OUTPATIENT
Start: 2020-04-14

## 2020-05-01 RX ORDER — LEVOTHYROXINE SODIUM 0.07 MG/1
TABLET ORAL
Qty: 90 TABLET | Refills: 3 | Status: SHIPPED | OUTPATIENT
Start: 2020-05-01

## 2020-05-05 ENCOUNTER — TELEPHONE (OUTPATIENT)
Dept: INTERNAL MEDICINE CLINIC | Facility: CLINIC | Age: 85
End: 2020-05-05

## 2020-06-03 ENCOUNTER — APPOINTMENT (OUTPATIENT)
Dept: HEMATOLOGY/ONCOLOGY | Facility: HOSPITAL | Age: 85
End: 2020-06-03
Attending: INTERNAL MEDICINE
Payer: MEDICARE

## 2021-02-12 ENCOUNTER — TELEPHONE (OUTPATIENT)
Dept: PULMONOLOGY | Facility: CLINIC | Age: 86
End: 2021-02-12

## 2024-09-11 NOTE — TELEPHONE ENCOUNTER
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 He is near the end of his stay and patient states he was due to discharge today. Staff at TCU stated patient possibly had a syncopal episode in the night, found lying in vomit and feces. Patient does not recall this incident but states he slid between the bed and the wall.  Focused Assessment:  AOx3. AVSS, somnolent. Sating in mid 90s on 2 liters NC. Redness to forehead. Generalized weakness.     Treatments and/or interventions provided:   Medications   cefTRIAXone (ROCEPHIN) 2 g vial to attach to  ml bag for ADULTS or NS 50 ml bag for PEDS (2 g Intravenous $New Bag 9/11/24 1323)   azithromycin (ZITHROMAX) 500 mg vial to attach to  mL bag (has no administration in time range)   sodium chloride 0.9% BOLUS 1,000 mL (0 mLs Intravenous Stopped 9/11/24 1258)   acetaminophen (TYLENOL) tablet 1,000 mg (1,000 mg Oral $Given 9/11/24 1134)       Patient's response to treatments and/or interventions: continue to monitor    To be done/followed up on inpatient unit:  continue to monitor    Does this patient have any cognitive concerns?:  none    Activity level - Baseline/Home:  Independent  Activity Level - Current:   Stand with Assist    Patient's Preferred language: English   Needed?: No    Isolation: None and Contact   Infection: Not Applicable  ESBL  Patient tested for COVID 19 prior to admission:  Pt  Has not  Been seen - pt  need  To  Be seen -  Within  90  Days - YES  Bariatric?: No    Vital Signs:   Vitals:    09/11/24 1226 09/11/24 1241 09/11/24 1256 09/11/24 1311   BP: 115/66 118/66 117/69 121/76   Pulse: 80 80 82 81   Resp: 16 13 13 (!) 9   Temp:       TempSrc:       SpO2:  95% 95% 94%   Weight:       Height:           Cardiac Rhythm:     Was the PSS-3 completed:   Yes  What interventions are required if any?               Family Comments: no one at bedside in ED  OBS brochure/video discussed/provided to patient/family: N/A              Name of person given brochure if not patient: n/a              Relationship to patient: n/a    For the majority of the shift this patient's behavior was Green.   Behavioral interventions performed were frequent rounding.    ED NURSE PHONE NUMBER: *[phone]

## 2024-10-10 NOTE — TELEPHONE ENCOUNTER
Last OV notes was pulled and faxed to the number was provided. Confirmation was received.
Maida Rueda from [facility] called requesting pt last office notes and date of appt with Dr. Shruthi Littlejohn  AJDEBRA#[id number]
approved   3/19/18  -  Noted   Can be faxed
Female
